# Patient Record
Sex: MALE | Race: BLACK OR AFRICAN AMERICAN | NOT HISPANIC OR LATINO | Employment: OTHER | URBAN - METROPOLITAN AREA
[De-identification: names, ages, dates, MRNs, and addresses within clinical notes are randomized per-mention and may not be internally consistent; named-entity substitution may affect disease eponyms.]

---

## 2017-02-20 DIAGNOSIS — M1A.00X0 IDIOPATHIC CHRONIC GOUT WITHOUT TOPHUS, UNSPECIFIED SITE: ICD-10-CM

## 2017-02-20 DIAGNOSIS — N52.8 OTHER MALE ERECTILE DYSFUNCTION: ICD-10-CM

## 2017-02-20 NOTE — TELEPHONE ENCOUNTER
Allopurinol 100 mg        Last Written Prescription Date: 9/23/16  Last Fill Quantity: 90, # refills: 1  Last Office Visit with [facility], [facility] or  Health prescribing provider:  10/7/16        Uric Acid   Date Value Ref Range Status   09/16/2016 9.3 (H) 3.5 - 8.5 mg/dL Final   ]  Creatinine   Date Value Ref Range Status   03/09/2016 2.10 (H) 0.66 - 1.25 mg/dL Final   ]  Lab Results   Component Value Date    WBC 5.0 03/10/2014     Lab Results   Component Value Date    RBC 3.92 03/10/2014     Lab Results   Component Value Date    HGB 8.6 03/22/2014     Lab Results   Component Value Date    HCT 33.1 03/10/2014     No components found for: MCT  Lab Results   Component Value Date    MCV 84 03/10/2014     Lab Results   Component Value Date    MCH 27.3 03/10/2014     Lab Results   Component Value Date    MCHC 32.3 03/10/2014     Lab Results   Component Value Date    RDW 12.9 03/10/2014     Lab Results   Component Value Date     03/21/2014     Lab Results   Component Value Date    AST 13 11/09/2013     Lab Results   Component Value Date    ALT 22 11/09/2013         viagra 100 mg       Last Written Prescription Date: 7/25/16  Last Fill Quantity: 6,  # refills: 10   Last Office Visit with [facility], [facility] or  Health prescribing provider: 10/7/16

## 2017-02-21 RX ORDER — SILDENAFIL 100 MG/1
100 TABLET, FILM COATED ORAL
Qty: 6 TABLET | Refills: 9 | Status: SHIPPED | OUTPATIENT
Start: 2017-02-21 | End: 2018-03-27

## 2017-02-21 RX ORDER — ALLOPURINOL 100 MG/1
100 TABLET ORAL DAILY
Qty: 90 TABLET | Refills: 1 | Status: SHIPPED | OUTPATIENT
Start: 2017-02-21 | End: 2017-10-09

## 2017-02-21 NOTE — TELEPHONE ENCOUNTER
Prescription approved per [facility] Refill Protocol.  Angelia Gore RN- Triage [employer]

## 2017-04-10 ENCOUNTER — OFFICE VISIT (OUTPATIENT)
Dept: FAMILY MEDICINE | Facility: CLINIC | Age: 57
End: 2017-04-10
Payer: COMMERCIAL

## 2017-04-10 VITALS
TEMPERATURE: 98 F | DIASTOLIC BLOOD PRESSURE: 80 MMHG | HEART RATE: 71 BPM | WEIGHT: 263 LBS | SYSTOLIC BLOOD PRESSURE: 128 MMHG | RESPIRATION RATE: 16 BRPM | BODY MASS INDEX: 32.7 KG/M2 | HEIGHT: 75 IN

## 2017-04-10 DIAGNOSIS — K21.9 GASTROESOPHAGEAL REFLUX DISEASE WITHOUT ESOPHAGITIS: ICD-10-CM

## 2017-04-10 DIAGNOSIS — R14.2 FLATULENCE, ERUCTATION AND GAS PAIN: ICD-10-CM

## 2017-04-10 DIAGNOSIS — I10 ESSENTIAL HYPERTENSION, BENIGN: Primary | Chronic | ICD-10-CM

## 2017-04-10 DIAGNOSIS — R14.3 FLATULENCE, ERUCTATION AND GAS PAIN: ICD-10-CM

## 2017-04-10 DIAGNOSIS — R14.1 FLATULENCE, ERUCTATION AND GAS PAIN: ICD-10-CM

## 2017-04-10 DIAGNOSIS — M1A.00X0 IDIOPATHIC CHRONIC GOUT WITHOUT TOPHUS, UNSPECIFIED SITE: ICD-10-CM

## 2017-04-10 PROCEDURE — 83516 IMMUNOASSAY NONANTIBODY: CPT | Performed by: FAMILY MEDICINE

## 2017-04-10 PROCEDURE — 99213 OFFICE O/P EST LOW 20 MIN: CPT | Performed by: FAMILY MEDICINE

## 2017-04-10 PROCEDURE — 86003 ALLG SPEC IGE CRUDE XTRC EA: CPT | Performed by: FAMILY MEDICINE

## 2017-04-10 PROCEDURE — 36415 COLL VENOUS BLD VENIPUNCTURE: CPT | Performed by: FAMILY MEDICINE

## 2017-04-10 PROCEDURE — 80053 COMPREHEN METABOLIC PANEL: CPT | Performed by: FAMILY MEDICINE

## 2017-04-10 PROCEDURE — 84550 ASSAY OF BLOOD/URIC ACID: CPT | Performed by: FAMILY MEDICINE

## 2017-04-10 NOTE — NURSING NOTE
"Chief Complaint   Patient presents with     Gastrointestinal Problem       Initial /80  Pulse 71  Temp 98  F (36.7  C) (Tympanic)  Resp 16  Ht 6' 3\" (1.905 m)  Wt 263 lb (119.3 kg)  BMI 32.87 kg/m2 Estimated body mass index is 32.87 kg/(m^2) as calculated from the following:    Height as of this encounter: 6' 3\" (1.905 m).    Weight as of this encounter: 263 lb (119.3 kg).  Medication Reconciliation: complete     Rosanne Navarro CMA      "

## 2017-04-10 NOTE — LETTER
[facility]  [address]  [facility] [id number] [phone]                                                                                                           Fabian Rod  [address]  [place] [facility] [address]    April 17, 2017      Dear Fabian,    The results of your recent tests were reviewed and are enclosed.     Mild allergy to shrimp, otherwise normal tests  Normal test, no evidence for gluten sensitivity  Metabolic panel shows decreased kidney function but improved from last test  Uric acid elevated, but improved from last test 6 mo ago  Results for orders placed or performed in visit on 04/10/17   Comprehensive metabolic panel (BMP + Alb, Alk Phos, ALT, AST, Total. Bili, TP)   Result Value Ref Range    Sodium 141 133 - 144 mmol/L    Potassium 5.0 3.4 - 5.3 mmol/L    Chloride 111 (H) 94 - 109 mmol/L    Carbon Dioxide 20 20 - 32 mmol/L    Anion Gap 10 3 - 14 mmol/L    Glucose 93 70 - 99 mg/dL    Urea Nitrogen 40 (H) 7 - 30 mg/dL    Creatinine 1.79 (H) 0.66 - 1.25 mg/dL    GFR Estimate 39 (L) >60 mL/min/1.7m2    GFR Estimate If Black 47 (L) >60 mL/min/1.7m2    Calcium 8.8 8.5 - 10.1 mg/dL    Bilirubin Total 0.2 0.2 - 1.3 mg/dL    Albumin 3.9 3.4 - 5.0 g/dL    Protein Total 7.7 6.8 - 8.8 g/dL    Alkaline Phosphatase 64 40 - 150 U/L    ALT 37 0 - 70 U/L    AST 20 0 - 45 U/L   Uric acid   Result Value Ref Range    Uric Acid 8.0 (H) 3.5 - 7.2 mg/dL   Allergy adult food panel   Result Value Ref Range    Allergen Clam  <0.10 KU(A)/L     <0.10  Interp: Class 0 - Negative, Consider nonallergic causes      Allergen Fish(Cod)  <0.10 KU(A)/L     <0.10  Interp: Class 0 - Negative, Consider nonallergic causes      Allergen Egg White  <0.10 KU(A)/L     <0.10  Interp: Class 0 - Negative, Consider nonallergic causes      Allergen [place]  <0.10 KU(A)/L     <0.10  Interp: Class 0 - Negative, Consider nonallergic causes      Allergen Milk  <0.10 KU/L      <0.10  Interp: Class 0 - Negative, Consider nonallergic causes      Allergen Peanut  <0.10 KU(A)/L     <0.10  Interp: Class 0 - Negative, Consider nonallergic causes      Allergen Shrimp 0.18 (H) <0.10 KU(A)/L    Allergen Soybean IgE  <0.10 KU(A)/L     <0.10  Interp: Class 0 - Negative, Consider nonallergic causes      Allergen, [place]  <0.10 KU(A)/L     <0.10  Interp: Class 0 - Negative, Consider nonallergic causes      Allergen Wheat  <0.10 KU(A)/L     <0.10  Interp: Class 0 - Negative, Consider nonallergic causes      Allergen Scallop  <0.10 KU(A)/L     <0.10  Interp: Class 0 - Negative, Consider nonallergic causes     Tissue transglutaminase spenser IgA and IgG   Result Value Ref Range    Tissue Transglutaminase Antibody IgA 1 <7 U/mL    Tissue Transglutaminase Spenser IgG <1  Negative   <7 U/mL             Thank you for choosing [facility].  We appreciate the opportunity to serve you and look forward to supporting your healthcare needs in the future.    If you have any questions or concerns, please call me or my staff at [phone].      Sincerely,    Eze Srtong MD

## 2017-04-10 NOTE — MR AVS SNAPSHOT
After Visit Summary   4/10/2017    Fabian Rod    MRN: [id number]           Patient Information     Date Of Birth          1960        Visit Information        Provider Department      4/10/2017 4:30 PM Eze Strong MD [facility]        Today's Diagnoses     Essential hypertension, benign    -  1    Idiopathic chronic gout without tophus, unspecified site        Gastroesophageal reflux disease without esophagitis        Flatulence, eructation and gas pain          Care Instructions    Avoid dairy products for 2 weeks        Follow-ups after your visit        Your next 10 appointments already scheduled     Apr 10, 2017  4:30 PM CDT   (Arrive by 2:40 PM)   [facility] with Eze Strong MD   [facility]    [address] [place] [id number] [phone]              Future tests that were ordered for you today     Open Future Orders        Priority Expected Expires Ordered    H Pylori antigen, stool Routine  5/10/2017 4/10/2017            Who to contact     If you have questions or need follow up information about today's clinic visit or your schedule please contact [facility] directly at [phone].  Normal or non-critical lab and imaging results will be communicated to you by MyChart, letter or phone within 4 business days after the clinic has received the results. If you do not hear from us within 7 days, please contact the clinic through MyChart or phone. If you have a critical or abnormal lab result, we will notify you by phone as soon as possible.  Submit refill requests through [employer] or call your pharmacy and they will forward the refill request to us. Please allow 3 business days for your refill to be completed.          Additional Information About Your Visit        [employer] Information     [employer] lets you send messages to your  "doctor, view your test results, renew your prescriptions, schedule appointments and more. To sign up, go to [email or web address] . Click on \"Log in\" on the left side of the screen, which will take you to the Welcome page. Then click on \"Sign up Now\" on the right side of the page.     You will be asked to enter the access code listed below, as well as some personal information. Please follow the directions to create your username and password.     Your access code is: [email or web address]  Expires: 2017  2:36 PM     Your access code will  in 90 days. If you need help or a new code, please call your [place] clinic or [phone].        Care EveryWhere ID     This is your Care EveryWhere ID. This could be used by other organizations to access your [place] medical records  [id number]        Your Vitals Were     Pulse Temperature Respirations Height BMI (Body Mass Index)       71 98  F (36.7  C) (Tympanic) 16 6' 3\" (1.905 m) 32.87 kg/m2        Blood Pressure from Last 3 Encounters:   04/10/17 128/80   10/18/16 (!) 153/91   10/07/16 146/88    Weight from Last 3 Encounters:   04/10/17 263 lb (119.3 kg)   10/18/16 253 lb 9.6 oz (115 kg)   10/07/16 258 lb (117 kg)              We Performed the Following     Allergy adult food panel     Comprehensive metabolic panel (BMP + Alb, Alk Phos, ALT, AST, Total. Bili, TP)     Tissue transglutaminase spenser IgA and IgG     Uric acid        Primary Care Provider Office Phone # Fax #    Eze Strong [phone]       [facility] [address] [facility] [address]        Thank you!     Thank you for choosing [facility]  for your care. Our goal is always to provide you with excellent care. Hearing back from our patients is one way we can continue to improve our services. Please take a few minutes to complete the written survey that you may receive in the mail after your visit with us. Thank you!             Your " Updated Medication List - Protect others around you: Learn how to safely use, store and throw away your medicines at www.disposemymeds.org.          This list is accurate as of: 4/10/17  2:36 PM.  Always use your most recent med list.                   Brand Name Dispense Instructions for use    allopurinol 100 MG tablet    ZYLOPRIM    90 tablet    Take 1 tablet (100 mg) by mouth daily       fluticasone 50 MCG/ACT spray    FLONASE    1 Package    Spray 2 sprays into both nostrils daily       indomethacin 50 MG capsule    INDOCIN    42 capsule    TAKE ONE CAPSULE BY MOUTH THREE TIMES DAILY WITH MEALS.       lisinopril-hydrochlorothiazide 20-12.5 MG per tablet    PRINZIDE/ZESTORETIC    180 tablet    Take 2 tablets by mouth daily       MULTIVITAMIN PO      Take by mouth daily Reported on 4/10/2017       sildenafil 100 MG cap/tab    VIAGRA    6 tablet    Take 1 tablet (100 mg) by mouth every 3 days

## 2017-04-10 NOTE — PROGRESS NOTES
"  SUBJECTIVE:                                                    Fabian Rod is a 56 year old male who presents to clinic today for the following health issues:      Gastrointestinal symptoms      Duration: years    Description:           REFLUX SYMPTOMS - acid taste in mouth and belching, flatulence     Intensity:  moderate    Accompanying signs and symptoms:  diarrhea and bloating    History  Previous {similar problem: no   Previous evaluation:  none    Aggravating factors: dairy    Alleviating factors: proton pump inhibitor - prilosec and nexium and align    Other Therapies tried: None   Only GI medication he is taking is the \"Align\"   He has stopped the Omeprazole     He notes more gas thru the day, more flatulence     Hypertension Follow-up      Outpatient blood pressures are not being checked.    Low Salt Diet: no added salt     Gout has been in good control.  He continues with the Allopurinol    Problem list and histories reviewed & adjusted, as indicated.  Additional history: as documented    Labs reviewed in EPIC    Reviewed and updated as needed this visit by clinical staff  Tobacco  Allergies  Meds       Reviewed and updated as needed this visit by Provider         ROS:  CONSTITUTIONAL:NEGATIVE for fever, chills, change in weight  RESP:NEGATIVE for significant cough or SOB  CV: NEGATIVE for chest pain, palpitations or peripheral edema  GI: POSITIVE for gas or bloating and heartburn or reflux  MUSCULOSKELETAL: POSITIVE  for Hx gout    OBJECTIVE:                                                    /80  Pulse 71  Temp 98  F (36.7  C) (Tympanic)  Resp 16  Ht 6' 3\" (1.905 m)  Wt 263 lb (119.3 kg)  BMI 32.87 kg/m2  Body mass index is 32.87 kg/(m^2).  GENERAL APPEARANCE: healthy, alert and no distress  RESP: lungs clear to auscultation - no rales, rhonchi or wheezes  CV: regular rates and rhythm, normal S1 S2, no S3 or S4 and no murmur, click or rub  ABDOMEN: soft, nontender, without " hepatosplenomegaly or masses and bowel sounds normal  MS: extremities normal- no gross deformities noted    Diagnostic test results:  Lab: see below, results pending     ASSESSMENT/PLAN:                                                        ICD-10-CM    1. Essential hypertension, benign I10 Comprehensive metabolic panel (BMP + Alb, Alk Phos, ALT, AST, Total. Bili, TP)   2. Idiopathic chronic gout without tophus, unspecified site M1A.00X0 Uric acid   3. Gastroesophageal reflux disease without esophagitis K21.9 Comprehensive metabolic panel (BMP + Alb, Alk Phos, ALT, AST, Total. Bili, TP)   4. Flatulence, eructation and gas pain R14.3 Allergy adult food panel    R14.1 H Pylori antigen, stool    R14.2 Tissue transglutaminase spenser IgA and IgG       Follow up with Provider - as needed   Will notify Pt of lab results    Patient Instructions   Avoid dairy products for 2 weeks      Eze Strong MD  [facility]

## 2017-04-11 LAB
ALBUMIN SERPL-MCNC: 3.9 G/DL (ref 3.4–5)
ALP SERPL-CCNC: 64 U/L (ref 40–150)
ALT SERPL W P-5'-P-CCNC: 37 U/L (ref 0–70)
ANION GAP SERPL CALCULATED.3IONS-SCNC: 10 MMOL/L (ref 3–14)
AST SERPL W P-5'-P-CCNC: 20 U/L (ref 0–45)
BILIRUB SERPL-MCNC: 0.2 MG/DL (ref 0.2–1.3)
BUN SERPL-MCNC: 40 MG/DL (ref 7–30)
CALCIUM SERPL-MCNC: 8.8 MG/DL (ref 8.5–10.1)
CHLORIDE SERPL-SCNC: 111 MMOL/L (ref 94–109)
CO2 SERPL-SCNC: 20 MMOL/L (ref 20–32)
CREAT SERPL-MCNC: 1.79 MG/DL (ref 0.66–1.25)
GFR SERPL CREATININE-BSD FRML MDRD: 39 ML/MIN/1.7M2
GLUCOSE SERPL-MCNC: 93 MG/DL (ref 70–99)
POTASSIUM SERPL-SCNC: 5 MMOL/L (ref 3.4–5.3)
PROT SERPL-MCNC: 7.7 G/DL (ref 6.8–8.8)
SODIUM SERPL-SCNC: 141 MMOL/L (ref 133–144)
URATE SERPL-MCNC: 8 MG/DL (ref 3.5–7.2)

## 2017-04-12 LAB
TTG IGA SER-ACNC: 1 U/ML
TTG IGG SER-ACNC: NORMAL U/ML

## 2017-04-15 LAB
CLAM IGE QN: ABNORMAL KU(A)/L
CODFISH IGE QN: ABNORMAL KU(A)/L
CORN IGE QN: ABNORMAL KU(A)/L
COW MILK IGE QN: ABNORMAL KU/L
EGG WHITE IGE QN: ABNORMAL KU(A)/L
PEANUT IGE QN: ABNORMAL KU(A)/L
SCALLOP IGE QN: ABNORMAL KU(A)/L
SHRIMP IGE QN: 0.18 KU(A)/L
SOYBEAN IGE QN: ABNORMAL KU(A)/L
WALNUT IGE QN: ABNORMAL KU(A)/L
WHEAT IGE QN: ABNORMAL KU(A)/L

## 2017-05-19 ENCOUNTER — OFFICE VISIT (OUTPATIENT)
Dept: FAMILY MEDICINE | Facility: CLINIC | Age: 57
End: 2017-05-19
Payer: COMMERCIAL

## 2017-05-19 VITALS
TEMPERATURE: 97.4 F | RESPIRATION RATE: 20 BRPM | OXYGEN SATURATION: 100 % | WEIGHT: 263 LBS | SYSTOLIC BLOOD PRESSURE: 150 MMHG | HEART RATE: 70 BPM | BODY MASS INDEX: 32.87 KG/M2 | DIASTOLIC BLOOD PRESSURE: 88 MMHG

## 2017-05-19 DIAGNOSIS — M25.532 LEFT WRIST PAIN: Primary | ICD-10-CM

## 2017-05-19 DIAGNOSIS — M1A.00X0 IDIOPATHIC CHRONIC GOUT WITHOUT TOPHUS, UNSPECIFIED SITE: ICD-10-CM

## 2017-05-19 DIAGNOSIS — M67.40 GANGLION CYST: ICD-10-CM

## 2017-05-19 PROCEDURE — 99213 OFFICE O/P EST LOW 20 MIN: CPT | Performed by: FAMILY MEDICINE

## 2017-05-19 RX ORDER — PREDNISONE 10 MG/1
TABLET ORAL
Qty: 12 TABLET | Refills: 0 | Status: SHIPPED | OUTPATIENT
Start: 2017-05-19 | End: 2017-10-09

## 2017-05-19 NOTE — MR AVS SNAPSHOT
"              After Visit Summary   2017    Fabian Rod    MRN: [id number]           Patient Information     Date Of Birth          1960        Visit Information        Provider Department      2017 1:15 PM Eze Strong MD [facility]        Today's Diagnoses     Left wrist pain    -  1    Idiopathic chronic gout without tophus, unspecified site           Follow-ups after your visit        Who to contact     If you have questions or need follow up information about today's clinic visit or your schedule please contact [facility] directly at [phone].  Normal or non-critical lab and imaging results will be communicated to you by [employer], letter or phone within 4 business days after the clinic has received the results. If you do not hear from us within 7 days, please contact the clinic through [employer] or phone. If you have a critical or abnormal lab result, we will notify you by phone as soon as possible.  Submit refill requests through [employer] or call your pharmacy and they will forward the refill request to us. Please allow 3 business days for your refill to be completed.          Additional Information About Your Visit        MyChart Information     [employer] lets you send messages to your doctor, view your test results, renew your prescriptions, schedule appointments and more. To sign up, go to [email or web address] . Click on \"Log in\" on the left side of the screen, which will take you to the Welcome page. Then click on \"Sign up Now\" on the right side of the page.     You will be asked to enter the access code listed below, as well as some personal information. Please follow the directions to create your username and password.     Your access code is: [email or web address]  Expires: 2017  2:36 PM     Your access code will  in 90 days. If you need help or a new code, please call your [facility] or [phone].        Care " EveryWhere ID     This is your Care EveryWhere ID. This could be used by other organizations to access your [place] medical records  [id number]        Your Vitals Were     Pulse Temperature Respirations Pulse Oximetry BMI (Body Mass Index)       70 97.4  F (36.3  C) (Tympanic) 20 100% 32.87 kg/m2        Blood Pressure from Last 3 Encounters:   05/19/17 150/88   04/10/17 128/80   10/18/16 (!) 153/91    Weight from Last 3 Encounters:   05/19/17 263 lb (119.3 kg)   04/10/17 263 lb (119.3 kg)   10/18/16 253 lb 9.6 oz (115 kg)              Today, you had the following     No orders found for display         Today's Medication Changes          These changes are accurate as of: 5/19/17  1:29 PM.  If you have any questions, ask your nurse or doctor.               Start taking these medicines.        Dose/Directions    predniSONE 10 MG tablet   Commonly known as:  DELTASONE   Used for:  Left wrist pain, Idiopathic chronic gout without tophus, unspecified site   Started by:  Eze Strong MD        Take 2 daily for 2 days, then 1 daily for 5 days, then 1/2 daily   Quantity:  12 tablet   Refills:  0            Where to get your medicines      These medications were sent to [id number] - [place] - [address]  [address] [address]     Phone:  [phone]     predniSONE 10 MG tablet                Primary Care Provider Office Phone # Fax #    Eze Strong [phone]       [facility] [address]  [facility] [address]        Thank you!     Thank you for choosing [facility]  for your care. Our goal is always to provide you with excellent care. Hearing back from our patients is one way we can continue to improve our services. Please take a few minutes to complete the written survey that you may receive in the mail after your visit with us. Thank you!             Your Updated Medication List - Protect others around you: Learn how to  safely use, store and throw away your medicines at www.disposemymeds.org.          This list is accurate as of: 5/19/17  1:29 PM.  Always use your most recent med list.                   Brand Name Dispense Instructions for use    allopurinol 100 MG tablet    ZYLOPRIM    90 tablet    Take 1 tablet (100 mg) by mouth daily       fluticasone 50 MCG/ACT spray    FLONASE    1 Package    Spray 2 sprays into both nostrils daily       indomethacin 50 MG capsule    INDOCIN    42 capsule    TAKE ONE CAPSULE BY MOUTH THREE TIMES DAILY WITH MEALS.       lisinopril-hydrochlorothiazide 20-12.5 MG per tablet    PRINZIDE/ZESTORETIC    180 tablet    TAKE 2 TABLETS DAILY       MULTIVITAMIN PO      Take by mouth daily Reported on 4/10/2017       predniSONE 10 MG tablet    DELTASONE    12 tablet    Take 2 daily for 2 days, then 1 daily for 5 days, then 1/2 daily       sildenafil 100 MG cap/tab    VIAGRA    6 tablet    Take 1 tablet (100 mg) by mouth every 3 days

## 2017-05-19 NOTE — NURSING NOTE
"Chief Complaint   Patient presents with     Wrist Pain       Initial /88 (BP Location: Left arm, Patient Position: Chair, Cuff Size: Adult Large)  Pulse 70  Temp 97.4  F (36.3  C) (Tympanic)  Resp 20  Wt 263 lb (119.3 kg)  SpO2 100%  BMI 32.87 kg/m2 Estimated body mass index is 32.87 kg/(m^2) as calculated from the following:    Height as of 4/10/17: 6' 3\" (1.905 m).    Weight as of this encounter: 263 lb (119.3 kg).  Medication Reconciliation: complete     Princess MONICA Otoole CMA      "

## 2017-05-19 NOTE — LETTER
[facility]  [address]  [place] [address]  [phone]                                                                                                           Fabian Rod  [address] [address]    May 19, 2017      To Whom it Concerns:     Fabian Marcel, was seen today.  He may be at work but light duty no lifting or twisting for Lt wrist for the next week.  After that he may return to full duty                  Sincerely,    Eze Strong MD

## 2017-05-19 NOTE — PATIENT INSTRUCTIONS
Ice packs to wrist for 4-5 days, then alternate ice & heat.  Use Ice massage on trigger point areas.  Do gentle Range of motion exercises

## 2017-05-19 NOTE — PROGRESS NOTES
SUBJECTIVE:                                                    Fabian Rod is a 56 year old male who presents to clinic today for the following health issues:    Musculoskeletal problem/pain      Duration: x 6 days    Description  Location: Left wrist    Intensity:  mild, severe    Accompanying signs and symptoms: swelling    History  Previous similar problem: no   Previous evaluation:  none    Precipitating or alleviating factors:  Trauma or overuse: YES does work long shifts at [employer]. 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  Additional history: as documented    Labs reviewed in EPIC    Reviewed and updated as needed this visit by clinical staff  Tobacco  Allergies  Meds  Problems  Med Hx  Surg Hx  Fam Hx  Soc Hx        Reviewed and updated as needed this visit by Provider  Allergies  Meds  Problems         ROS:  CONSTITUTIONAL:NEGATIVE for fever, chills, change in weight  MUSCULOSKELETAL: POSITIVE  for arthralgias Lt wrist and joint pain Lt wrist.  Hx of gout    OBJECTIVE:                                                    /88 (BP Location: Left arm, Patient Position: Chair, Cuff Size: Adult Large)  Pulse 70  Temp 97.4  F (36.3  C) (Tympanic)  Resp 20  Wt 263 lb (119.3 kg)  SpO2 100%  BMI 32.87 kg/m2  Body mass index is 32.87 kg/(m^2).  GENERAL APPEARANCE: healthy, alert and no distress  MS: extremities normal- no gross deformities noted  ORTHO: Wrist Exam: WRIST:  Inspection: swelling Lt lateral dorsum wrist, ganglion cyst present  Palpation: Tender: extensor tendons, flexor tendons  Non-tender: distal radius, distal ulna  Range of Motion: normal, painful  Strength: no deficits  Special tests: negative Tinel's at carpal tunnel.      ELBOW:  elbow exam not done      Diagnostic test  results:  none      ASSESSMENT/PLAN:                                                        ICD-10-CM    1. Left wrist pain M25.532 predniSONE (DELTASONE) 10 MG tablet   2. Idiopathic chronic gout without tophus, unspecified site M1A.00X0 predniSONE (DELTASONE) 10 MG tablet   3. Ganglion cyst M67.40     Lt wrist       Follow up with Provider - as needed if not improving   Patient Instructions   Ice packs to wrist for 4-5 days, then alternate ice & heat.  Use Ice massage on trigger point areas.  Do gentle Range of motion exercises      Eze Strong MD  [facility]

## 2017-05-26 ENCOUNTER — TELEPHONE (OUTPATIENT)
Dept: FAMILY MEDICINE | Facility: CLINIC | Age: 57
End: 2017-05-26

## 2017-05-26 DIAGNOSIS — M25.532 LEFT WRIST PAIN: Primary | ICD-10-CM

## 2017-05-26 NOTE — TELEPHONE ENCOUNTER
Pt is requesting a new note from provider stating he can return to work with no restrictions.   He can  note at [facility] today.

## 2017-05-26 NOTE — TELEPHONE ENCOUNTER
Called and informed patient letter is ready for pickup at [facility].    Princess MONICA Otoole, [facility]

## 2017-05-26 NOTE — LETTER
[facility]  [address]  [place] [address]  [phone]                                                                                                           Fabian Rod  [address]  [place] [address]    May 26, 2017      To Whom it Concerns     Fabian Rod, may return to work without any restrictions as of Saturday May 27,2017                    Sincerely,    Eze Strong MD

## 2017-09-05 ENCOUNTER — OFFICE VISIT (OUTPATIENT)
Dept: FAMILY MEDICINE | Facility: CLINIC | Age: 57
End: 2017-09-05
Payer: COMMERCIAL

## 2017-09-05 VITALS
OXYGEN SATURATION: 99 % | RESPIRATION RATE: 16 BRPM | TEMPERATURE: 98.4 F | HEIGHT: 75 IN | DIASTOLIC BLOOD PRESSURE: 82 MMHG | BODY MASS INDEX: 32.33 KG/M2 | SYSTOLIC BLOOD PRESSURE: 138 MMHG | HEART RATE: 72 BPM | WEIGHT: 260 LBS

## 2017-09-05 DIAGNOSIS — H66.002 ACUTE SUPPURATIVE OTITIS MEDIA OF LEFT EAR WITHOUT SPONTANEOUS RUPTURE OF TYMPANIC MEMBRANE, RECURRENCE NOT SPECIFIED: Primary | ICD-10-CM

## 2017-09-05 DIAGNOSIS — H60.392 OTHER INFECTIVE ACUTE OTITIS EXTERNA OF LEFT EAR: ICD-10-CM

## 2017-09-05 DIAGNOSIS — R14.2 FLATULENCE, ERUCTATION AND GAS PAIN: ICD-10-CM

## 2017-09-05 DIAGNOSIS — R14.1 FLATULENCE, ERUCTATION AND GAS PAIN: ICD-10-CM

## 2017-09-05 DIAGNOSIS — R14.3 FLATULENCE, ERUCTATION AND GAS PAIN: ICD-10-CM

## 2017-09-05 PROCEDURE — 99214 OFFICE O/P EST MOD 30 MIN: CPT | Performed by: PHYSICIAN ASSISTANT

## 2017-09-05 RX ORDER — AMOXICILLIN 875 MG
875 TABLET ORAL 2 TIMES DAILY
Qty: 20 TABLET | Refills: 0 | Status: SHIPPED | OUTPATIENT
Start: 2017-09-05 | End: 2017-09-18

## 2017-09-05 RX ORDER — CIPROFLOXACIN 0.5 MG/.25ML
0.25 SOLUTION/ DROPS AURICULAR (OTIC) EVERY 12 HOURS
Qty: 3.5 ML | Refills: 0 | Status: SHIPPED | OUTPATIENT
Start: 2017-09-05 | End: 2017-09-12

## 2017-09-05 NOTE — PATIENT INSTRUCTIONS
While waiting for gastroenterology try using ranitidine (Zantac) twice daily for your symptoms. Also try to avoid foods as noted below.       These home care steps can help you manage GERD:    Maintain a healthy weight. Get help to lose any extra pounds.    Avoid lying down after meals.    Avoid eating late at night.    Elevate the head of your bed by 6 inches. You can do this by placing wooden blocks or bed risers under the head of your bed.    Avoid wearing tight-fitting clothes.    Avoid foods that might irritate your stomach, such as the following:    Alcohol    Fat    Chocolate    Caffeine    Spearmint or peppermint    Talk to your healthcare provider if you are taking any of the following medicines. These medicines can make GERD symptoms worse:    Calcium channel blockers    Theophylline    Anticholinergic medicines, such as oxybutynin and benzatropine    Begin an exercise program. Ask your healthcare provider how to get started. You can benefit from simple activities, such as walking or gardening.    Break the smoking habit. Enroll in a stop-smoking program to improve your chances of success.    Limit alcohol intake to no more than 2 drinks a day.    Take your medicines exactly as directed. Don t skip doses.    Avoid over-the-counter nonsteroidal anti-inflammatory medicines, such as aspirin and ibuprofen, unless recommended by your healthcare provider for certain conditions.     If possible, avoid nitrates (heart medicines, such as nitroglycerin and isosorbide dinitrate ).  Follow-up care  Make a follow-up appointment as directed by our staff.     When to call the healthcare provider  Call your healthcare provider immediately if you have any of the following:    Trouble swallowing    Pain when swallowing    Feeling of food caught in your chest or throat    Pain in the neck, chest, or back    Heartburn that causes you to vomit    Vomiting blood    Black or tarry stools (from digested blood)    More saliva  (watering of the mouth) than usual    Weight loss of more than 3% to 5% of your total body weight in a month    Hoarseness or sore throat that won t go away    Choking, coughing, or wheezing   Date Last Reviewed: 7/1/2016 2000-2017 [employer]. [address] [place] [address]. All rights reserved. This information is not intended as a substitute for professional medical care. Always follow your healthcare professional's instructions.

## 2017-09-05 NOTE — MR AVS SNAPSHOT
After Visit Summary   9/5/2017    Fabian Rod    MRN: [id number]           Patient Information     Date Of Birth          1960        Visit Information        Provider Department      9/5/2017 10:30 AM Siegler, Nicole Joy, PA-C [facility]        Today's Diagnoses     Acute suppurative otitis media of left ear without spontaneous rupture of tympanic membrane, recurrence not specified    -  1    Other infective acute otitis externa of left ear        Flatulence, eructation and gas pain          Care Instructions    While waiting for gastroenterology try using ranitidine (Zantac) twice daily for your symptoms. Also try to avoid foods as noted below.       These home care steps can help you manage GERD:    Maintain a healthy weight. Get help to lose any extra pounds.    Avoid lying down after meals.    Avoid eating late at night.    Elevate the head of your bed by 6 inches. You can do this by placing wooden blocks or bed risers under the head of your bed.    Avoid wearing tight-fitting clothes.    Avoid foods that might irritate your stomach, such as the following:    Alcohol    Fat    Chocolate    Caffeine    Spearmint or peppermint    Talk to your healthcare provider if you are taking any of the following medicines. These medicines can make GERD symptoms worse:    Calcium channel blockers    Theophylline    Anticholinergic medicines, such as oxybutynin and benzatropine    Begin an exercise program. Ask your healthcare provider how to get started. You can benefit from simple activities, such as walking or gardening.    Break the smoking habit. Enroll in a stop-smoking program to improve your chances of success.    Limit alcohol intake to no more than 2 drinks a day.    Take your medicines exactly as directed. Don t skip doses.    Avoid over-the-counter nonsteroidal anti-inflammatory medicines, such as aspirin and ibuprofen, unless recommended by your healthcare provider  for certain conditions.     If possible, avoid nitrates (heart medicines, such as nitroglycerin and isosorbide dinitrate ).  Follow-up care  Make a follow-up appointment as directed by our staff.     When to call the healthcare provider  Call your healthcare provider immediately if you have any of the following:    Trouble swallowing    Pain when swallowing    Feeling of food caught in your chest or throat    Pain in the neck, chest, or back    Heartburn that causes you to vomit    Vomiting blood    Black or tarry stools (from digested blood)    More saliva (watering of the mouth) than usual    Weight loss of more than 3% to 5% of your total body weight in a month    Hoarseness or sore throat that won t go away    Choking, coughing, or wheezing   Date Last Reviewed: 7/1/2016 2000-2017 [employer]. [address] [place] [address]. All rights reserved. This information is not intended as a substitute for professional medical care. Always follow your healthcare professional's instructions.                Follow-ups after your visit        Who to contact     If you have questions or need follow up information about today's clinic visit or your schedule please contact [facility] directly at [phone].  Normal or non-critical lab and imaging results will be communicated to you by [employer], letter or phone within 4 business days after the clinic has received the results. If you do not hear from us within 7 days, please contact the clinic through [employer] or phone. If you have a critical or abnormal lab result, we will notify you by phone as soon as possible.  Submit refill requests through [employer] or call your pharmacy and they will forward the refill request to us. Please allow 3 business days for your refill to be completed.          Additional Information About Your Visit        [employer] Information     [employer] lets you send messages to your doctor, view your test  "results, renew your prescriptions, schedule appointments and more. To sign up, go to [email or web address] . Click on \"Log in\" on the left side of the screen, which will take you to the Welcome page. Then click on \"Sign up Now\" on the right side of the page.     You will be asked to enter the access code listed below, as well as some personal information. Please follow the directions to create your username and password.     Your access code is: [email or web address]  Expires: 2017 10:54 AM     Your access code will  in 90 days. If you need help or a new code, please call your [facility] [facility] clinic or [phone].        Care EveryWhere ID     This is your Care EveryWhere ID. This could be used by other organizations to access your [facility] [facility] medical records  [id number]        Your Vitals Were     Pulse Temperature Respirations Height Pulse Oximetry BMI (Body Mass Index)    72 98.4  F (36.9  C) (Tympanic) 16 6' 3\" (1.905 m) 99% 32.5 kg/m2       Blood Pressure from Last 3 Encounters:   17 138/82   17 150/88   04/10/17 128/80    Weight from Last 3 Encounters:   17 260 lb (117.9 kg)   17 263 lb (119.3 kg)   04/10/17 263 lb (119.3 kg)              Today, you had the following     No orders found for display         Today's Medication Changes          These changes are accurate as of: 17 10:54 AM.  If you have any questions, ask your nurse or doctor.               Start taking these medicines.        Dose/Directions    amoxicillin 875 MG tablet   Commonly known as:  AMOXIL   Used for:  Acute suppurative otitis media of left ear without spontaneous rupture of tympanic membrane, recurrence not specified   Started by:  Siegler, Nicole Joy, PA-C        Dose:  875 mg   Take 1 tablet (875 mg) by mouth 2 times daily   Quantity:  20 tablet   Refills:  0       ciprofloxacin 0.2 % otic solution   Commonly known as:  CETRAXAL   Used for:  Other infective acute otitis externa of left ear   Started by:  " Siegler, Nicole Joy, PA-C        Dose:  0.25 mL   Place 0.25 mLs Into the left ear every 12 hours for 7 days   Quantity:  3.5 mL   Refills:  0            Where to get your medicines      These medications were sent to [id number] - [place], [place] - [address]  [address] [place] [address]     Phone:  [phone]     amoxicillin 875 MG tablet    ciprofloxacin 0.2 % otic solution                Primary Care Provider Office Phone # Fax #    Eze Strong [phone] [address] [facility] [address]        Equal Access to Services     [facility]SHAN [facility] : Hadii sharif payton hadasho Soomaali, waaxda luqadaha, qaybta kaalmada adeegyada, breanna aceves. So [facility] [phone].    ATENCIÓN: Si habla español, tiene a sorenson disposición servicios gratuitos de asistencia lingüística. Llame al [phone].    We comply with applicable federal civil rights laws and [place] laws. We do not discriminate on the basis of race, color, national origin, age, disability sex, sexual orientation or gender identity.            Thank you!     Thank you for choosing [facility]  for your care. Our goal is always to provide you with excellent care. Hearing back from our patients is one way we can continue to improve our services. Please take a few minutes to complete the written survey that you may receive in the mail after your visit with us. Thank you!             Your Updated Medication List - Protect others around you: Learn how to safely use, store and throw away your medicines at www.disposemymeds.org.          This list is accurate as of: 9/5/17 10:54 AM.  Always use your most recent med list.                   Brand Name Dispense Instructions for use Diagnosis    allopurinol 100 MG tablet    ZYLOPRIM    90 tablet    Take 1 tablet (100 mg) by mouth daily    Idiopathic chronic gout without tophus, unspecified site       amoxicillin 875 MG tablet     AMOXIL    20 tablet    Take 1 tablet (875 mg) by mouth 2 times daily    Acute suppurative otitis media of left ear without spontaneous rupture of tympanic membrane, recurrence not specified       ciprofloxacin 0.2 % otic solution    CETRAXAL    3.5 mL    Place 0.25 mLs Into the left ear every 12 hours for 7 days    Other infective acute otitis externa of left ear       fluticasone 50 MCG/ACT spray    FLONASE    1 Package    Spray 2 sprays into both nostrils daily    Bad breath       indomethacin 50 MG capsule    INDOCIN    42 capsule    TAKE ONE CAPSULE BY MOUTH THREE TIMES DAILY WITH MEALS.    Right hand pain       lisinopril-hydrochlorothiazide 20-12.5 MG per tablet    PRINZIDE/ZESTORETIC    180 tablet    TAKE 2 TABLETS DAILY    Essential hypertension, benign       MULTIVITAMIN PO      Take by mouth daily Reported on 4/10/2017        predniSONE 10 MG tablet    DELTASONE    12 tablet    Take 2 daily for 2 days, then 1 daily for 5 days, then 1/2 daily    Left wrist pain, Idiopathic chronic gout without tophus, unspecified site       sildenafil 100 MG cap/tab    VIAGRA    6 tablet    Take 1 tablet (100 mg) by mouth every 3 days    Other male erectile dysfunction

## 2017-09-05 NOTE — PROGRESS NOTES
SUBJECTIVE:   Fabian Rod is a 57 year old male who presents to clinic today for the following health issues:    Left Ear Problem      Duration: X2 weeks    Description (location/character/radiation): Plugged with brownish drainage.    Intensity:  mild    Accompanying signs and symptoms: See above    History (similar episodes/previous evaluation): Yes    Precipitating or alleviating factors: None    Therapies tried and outcome: Has used OTC product for ear pain     Ear discharge when he wakes up and during the day he has clogged sensation. He can still hear out of the ear. Cold symptoms that he had are improved.     Gastrointestinal symptoms      Duration: Ongoing    Description:           Increased gas and burping    Intensity:  moderate, severe    Accompanying signs and symptoms:  none    History  Previous similar problem: YES  Previous evaluation:  Has appointment with [facility]    Aggravating factors: none and     Alleviating factors: nothing and stopped all milk products    Other Therapies tried: Phazyme     He does drink a lot of water and has daily belching and burping, as above.   He has taken prilosec and many other products in the past.   He does still have regular BMs without blood or mucus.     Problem list and histories reviewed & adjusted, as indicated.  Additional history: as documented    Patient Active Problem List   Diagnosis     Hyperlipidemia LDL goal <100     Knee pain     Head & neck symptoms NEC     Anemia     Halitosis     Essential hypertension, benign     Avascular necrosis of bone of hip (H)     OA (osteoarthritis) of hip     Hip pain     ACP (advance care planning)     Other male erectile dysfunction     Arthralgia of right hand     Idiopathic chronic gout without tophus, unspecified site     Non morbid obesity due to excess calories     Ganglion cyst     Past Surgical History:   Procedure Laterality Date     ARTHROPLASTY MINIMALLY INVASIVE HIP  3/18/2014    Procedure: ARTHROPLASTY  MINIMALLY INVASIVE HIP;  Left Hip Arthroplasty Minimally Invasive Two Incision;  Surgeon: Azar Ordoñez MD;  Location: [facility] OR     HC TOOTH EXTRACTION W/FORCEP  1996     TONSILLECTOMY  04/2006       Social History   Substance Use Topics     Smoking status: Former Smoker     Smokeless tobacco: Never Used      Comment: quit 20 years ago     Alcohol use 6.0 oz/week     12 Cans of beer per week      Comment: Per week     Family History   Problem Relation Age of Onset     Family History Negative Mother      Family History Negative Father      DIABETES No family hx of      Coronary Artery Disease No family hx of      Hypertension No family hx of      Hyperlipidemia No family hx of      CEREBROVASCULAR DISEASE No family hx of      Breast Cancer No family hx of      Colon Cancer No family hx of      Prostate Cancer No family hx of      Other Cancer No family hx of      Depression No family hx of      Anxiety Disorder No family hx of      MENTAL ILLNESS No family hx of      Substance Abuse No family hx of      Anesthesia Reaction No family hx of      Asthma No family hx of      OSTEOPOROSIS No family hx of      Genetic Disorder No family hx of      Thyroid Disease No family hx of      Obesity No family hx of      Unknown/Adopted No family hx of          Current Outpatient Prescriptions   Medication Sig Dispense Refill     amoxicillin (AMOXIL) 875 MG tablet Take 1 tablet (875 mg) by mouth 2 times daily 20 tablet 0     ciprofloxacin (CETRAXAL) 0.2 % otic solution Place 0.25 mLs Into the left ear every 12 hours for 7 days 3.5 mL 0     lisinopril-hydrochlorothiazide (PRINZIDE/ZESTORETIC) 20-12.5 MG per tablet TAKE 2 TABLETS DAILY 180 tablet 1     allopurinol (ZYLOPRIM) 100 MG tablet Take 1 tablet (100 mg) by mouth daily 90 tablet 1     sildenafil (VIAGRA) 100 MG cap/tab Take 1 tablet (100 mg) by mouth every 3 days 6 tablet 9     indomethacin (INDOCIN) 50 MG capsule TAKE ONE CAPSULE BY MOUTH THREE TIMES DAILY WITH MEALS. 42  "capsule 0     fluticasone (FLONASE) 50 MCG/ACT nasal spray Spray 2 sprays into both nostrils daily 1 Package 11     Multiple Vitamins-Minerals (MULTIVITAMIN OR) Take by mouth daily Reported on 4/10/2017       predniSONE (DELTASONE) 10 MG tablet Take 2 daily for 2 days, then 1 daily for 5 days, then 1/2 daily (Patient not taking: Reported on 9/5/2017) 12 tablet 0         Reviewed and updated as needed this visit by clinical staffTobacco  Allergies  Meds  Med Hx  Surg Hx  Fam Hx  Soc Hx      Reviewed and updated as needed this visit by Provider         ROS:  Constitutional, HEENT, cardiovascular, pulmonary, gi and gu systems are negative, except as otherwise noted.      OBJECTIVE:   /82 (BP Location: Right arm, Patient Position: Sitting, Cuff Size: Adult Regular)  Pulse 72  Temp 98.4  F (36.9  C) (Tympanic)  Resp 16  Ht 6' 3\" (1.905 m)  Wt 260 lb (117.9 kg)  SpO2 99%  BMI 32.5 kg/m2  Body mass index is 32.5 kg/(m^2).  GENERAL: healthy, alert and no distress  EYES: Eyes grossly normal to inspection, PERRL and conjunctivae and sclerae normal  HENT: normal cephalic/atraumatic, right ear: normal: no effusions, no erythema, normal landmarks, left ear: canal with creamy discharge and erythema, TM with purulent effusion and bulging, nose and mouth without ulcers or lesions, oropharynx clear and oral mucous membranes moist  NECK: no adenopathy, no asymmetry, masses, or scars and thyroid normal to palpation  ABDOMEN: soft, nontender, no hepatosplenomegaly, no masses and bowel sounds normal  SKIN: no suspicious lesions or rashes  PSYCH: mentation appears normal, affect normal/bright    Diagnostic Test Results:  none     ASSESSMENT/PLAN:       ICD-10-CM    1. Acute suppurative otitis media of left ear without spontaneous rupture of tympanic membrane, recurrence not specified H66.002 amoxicillin (AMOXIL) 875 MG tablet   2. Other infective acute otitis externa of left ear H60.392 ciprofloxacin (CETRAXAL) 0.2 % " otic solution   3. Flatulence, eructation and gas pain R14.3     R14.1     R14.2        Discussed medication use for otitis externa and media, be seen again if symptoms not completely improved with this medication. Discussed instructions below as well while he waits for gastroenterology consultation next month.     Patient Instructions     While waiting for gastroenterology try using ranitidine (Zantac) twice daily for your symptoms. Also try to avoid foods as noted below.       These home care steps can help you manage GERD:    Maintain a healthy weight. Get help to lose any extra pounds.    Avoid lying down after meals.    Avoid eating late at night.    Elevate the head of your bed by 6 inches. You can do this by placing wooden blocks or bed risers under the head of your bed.    Avoid wearing tight-fitting clothes.    Avoid foods that might irritate your stomach, such as the following:    Alcohol    Fat    Chocolate    Caffeine    Spearmint or peppermint    Talk to your healthcare provider if you are taking any of the following medicines. These medicines can make GERD symptoms worse:    Calcium channel blockers    Theophylline    Anticholinergic medicines, such as oxybutynin and benzatropine    Begin an exercise program. Ask your healthcare provider how to get started. You can benefit from simple activities, such as walking or gardening.    Break the smoking habit. Enroll in a stop-smoking program to improve your chances of success.    Limit alcohol intake to no more than 2 drinks a day.    Take your medicines exactly as directed. Don t skip doses.    Avoid over-the-counter nonsteroidal anti-inflammatory medicines, such as aspirin and ibuprofen, unless recommended by your healthcare provider for certain conditions.     If possible, avoid nitrates (heart medicines, such as nitroglycerin and isosorbide dinitrate ).  Follow-up care  Make a follow-up appointment as directed by our staff.     When to call the  healthcare provider  Call your healthcare provider immediately if you have any of the following:    Trouble swallowing    Pain when swallowing    Feeling of food caught in your chest or throat    Pain in the neck, chest, or back    Heartburn that causes you to vomit    Vomiting blood    Black or tarry stools (from digested blood)    More saliva (watering of the mouth) than usual    Weight loss of more than 3% to 5% of your total body weight in a month    Hoarseness or sore throat that won t go away    Choking, coughing, or wheezing   Date Last Reviewed: 7/1/2016 2000-2017 [employer]. [address] [place] [address]. All rights reserved. This information is not intended as a substitute for professional medical care. Always follow your healthcare professional's instructions.            Nicole Joy Siegler, PA-C  [facility]

## 2017-09-05 NOTE — NURSING NOTE
"Chief Complaint   Patient presents with     Ear Problem     Ear feels clogged, brown discharge X3 weeks     Gastrointestinal Problem     Increased gas and burping. Has appt. scheduled with GI. Using Phazyme with some relief.       Initial There were no vitals taken for this visit. Estimated body mass index is 32.87 kg/(m^2) as calculated from the following:    Height as of 4/10/17: 6' 3\" (1.905 m).    Weight as of 5/19/17: 263 lb (119.3 kg).  Medication Reconciliation: complete     Samara Marquez LPN  "

## 2017-09-18 DIAGNOSIS — H66.002 ACUTE SUPPURATIVE OTITIS MEDIA OF LEFT EAR WITHOUT SPONTANEOUS RUPTURE OF TYMPANIC MEMBRANE, RECURRENCE NOT SPECIFIED: ICD-10-CM

## 2017-09-18 RX ORDER — AMOXICILLIN 875 MG
TABLET ORAL
Qty: 20 TABLET | Refills: 0 | Status: SHIPPED | OUTPATIENT
Start: 2017-09-18 | End: 2017-10-09

## 2017-09-18 NOTE — TELEPHONE ENCOUNTER
Amoxicillin    Call made to patient : he still is with sx's of otitis externa and media. Per note of Nicole Siegler, PA patient to be seen if not better. Prefers to wait to see Dr. Eze Strong -first opening is Thurs 9-21-17.      Last Written Prescription Date:  9-5-17  Last Fill Quantity: 20,   # refills: 0  Last Office Visit with [facility], [facility] or [facility] prescribing provider: 9-5-17  Future Office visit:       Routing refill request to provider for review/approval because:  Drug not on the [facility], [facility] or [facility] refill protocol or controlled substance

## 2017-09-18 NOTE — TELEPHONE ENCOUNTER
I will send refill to have him start taking, but he should schedule appt to be seen for recheck, especially if not improving

## 2017-10-06 DIAGNOSIS — M1A.00X0 IDIOPATHIC CHRONIC GOUT WITHOUT TOPHUS, UNSPECIFIED SITE: ICD-10-CM

## 2017-10-06 RX ORDER — ALLOPURINOL 100 MG/1
TABLET ORAL
Qty: 90 TABLET | Refills: 1 | Status: CANCELLED | OUTPATIENT
Start: 2017-10-06

## 2017-10-09 ENCOUNTER — OFFICE VISIT (OUTPATIENT)
Dept: FAMILY MEDICINE | Facility: CLINIC | Age: 57
End: 2017-10-09
Payer: COMMERCIAL

## 2017-10-09 VITALS
RESPIRATION RATE: 20 BRPM | OXYGEN SATURATION: 96 % | DIASTOLIC BLOOD PRESSURE: 90 MMHG | HEART RATE: 69 BPM | SYSTOLIC BLOOD PRESSURE: 138 MMHG | WEIGHT: 262.5 LBS | TEMPERATURE: 98.3 F | BODY MASS INDEX: 32.81 KG/M2

## 2017-10-09 DIAGNOSIS — M79.641 RIGHT HAND PAIN: ICD-10-CM

## 2017-10-09 DIAGNOSIS — M25.532 LEFT WRIST PAIN: ICD-10-CM

## 2017-10-09 DIAGNOSIS — M1A.00X0 IDIOPATHIC CHRONIC GOUT WITHOUT TOPHUS, UNSPECIFIED SITE: ICD-10-CM

## 2017-10-09 LAB
ERYTHROCYTE [SEDIMENTATION RATE] IN BLOOD BY WESTERGREN METHOD: 16 MM/H (ref 0–20)
URATE SERPL-MCNC: 7.5 MG/DL (ref 3.5–7.2)

## 2017-10-09 PROCEDURE — 99213 OFFICE O/P EST LOW 20 MIN: CPT | Performed by: FAMILY MEDICINE

## 2017-10-09 PROCEDURE — 84550 ASSAY OF BLOOD/URIC ACID: CPT | Performed by: FAMILY MEDICINE

## 2017-10-09 PROCEDURE — 85652 RBC SED RATE AUTOMATED: CPT | Performed by: FAMILY MEDICINE

## 2017-10-09 PROCEDURE — 36415 COLL VENOUS BLD VENIPUNCTURE: CPT | Performed by: FAMILY MEDICINE

## 2017-10-09 RX ORDER — INDOMETHACIN 50 MG/1
CAPSULE ORAL
Qty: 42 CAPSULE | Refills: 1 | Status: SHIPPED | OUTPATIENT
Start: 2017-10-09 | End: 2021-07-06

## 2017-10-09 RX ORDER — PREDNISONE 10 MG/1
TABLET ORAL
Qty: 12 TABLET | Refills: 0 | Status: SHIPPED | OUTPATIENT
Start: 2017-10-09 | End: 2019-03-25

## 2017-10-09 RX ORDER — ALLOPURINOL 100 MG/1
100 TABLET ORAL DAILY
Qty: 90 TABLET | Refills: 1 | Status: SHIPPED | OUTPATIENT
Start: 2017-10-09 | End: 2017-12-20

## 2017-10-09 NOTE — TELEPHONE ENCOUNTER
allopurinol (ZYLOPRIM) 100 MG tablet    Last Written Prescription Date: 2/21/17  Last Fill Quantity: 90, # refills: 1  Last Office Visit with [facility] or [facility] prescribing provider:  10/9/17        Uric Acid   Date Value Ref Range Status   04/10/2017 8.0 (H) 3.5 - 7.2 mg/dL Final   ]  Creatinine   Date Value Ref Range Status   04/10/2017 1.79 (H) 0.66 - 1.25 mg/dL Final   ]  Lab Results   Component Value Date    WBC 5.0 03/10/2014     Lab Results   Component Value Date    RBC 3.92 03/10/2014     Lab Results   Component Value Date    HGB 8.6 03/22/2014     Lab Results   Component Value Date    HCT 33.1 03/10/2014     No components found for: MCT  Lab Results   Component Value Date    MCV 84 03/10/2014     Lab Results   Component Value Date    MCH 27.3 03/10/2014     Lab Results   Component Value Date    MCHC 32.3 03/10/2014     Lab Results   Component Value Date    RDW 12.9 03/10/2014     Lab Results   Component Value Date     03/21/2014     Lab Results   Component Value Date    AST 20 04/10/2017     Lab Results   Component Value Date    ALT 37 04/10/2017

## 2017-10-09 NOTE — NURSING NOTE
"Chief Complaint   Patient presents with     Arthritis     LT hand, middle and ring finger       Initial /90 (BP Location: Left arm, Patient Position: Chair, Cuff Size: Adult Large)  Pulse 69  Temp 98.3  F (36.8  C) (Oral)  Resp 20  Wt 262 lb 8 oz (119.1 kg)  SpO2 96%  BMI 32.81 kg/m2 Estimated body mass index is 32.81 kg/(m^2) as calculated from the following:    Height as of 9/5/17: 6' 3\" (1.905 m).    Weight as of this encounter: 262 lb 8 oz (119.1 kg).  Medication Reconciliation: complete     Princess MONICA Otoole CMA      "

## 2017-10-09 NOTE — PROGRESS NOTES
SUBJECTIVE:   Fabian Rod is a 57 year old male who presents to clinic today for the following health issues:      Requesting Return to Work letter with possible restrictions.    Gout  Duration: x 8 days  Description   Location: fingers - left, 3rd and 4th fingers  Joint Swelling: YES  Redness: no   Pain intensity:  Moderate  Accompanying signs and symptoms: None  History  Previous history of gout: YES   Trauma to the area: no   Precipitating factors:   Alcohol usage: none  Diuretic use: no   Recent illness: YES cold  Therapies tried and outcome: Allopurinol  Pt has been icing hand.  He has continued to take the Allopurinol.  He has been out of the Indomethacin and Prednisone for some time    Pt is scheduled to return to work on Wed, he needs note      Problem list and histories reviewed & adjusted, as indicated.  Additional history: as documented    Labs reviewed in EPIC    Reviewed and updated as needed this visit by clinical staffTobacco  Allergies  Meds  Med Hx  Surg Hx  Fam Hx  Soc Hx      Reviewed and updated as needed this visit by Provider         ROS:  CONSTITUTIONAL:NEGATIVE for fever, chills, change in weight  MUSCULOSKELETAL: POSITIVE  for arthralgias Lt hand 3-4 fingers and Hx gout    OBJECTIVE:                                                    /90 (BP Location: Left arm, Patient Position: Chair, Cuff Size: Adult Large)  Pulse 69  Temp 98.3  F (36.8  C) (Oral)  Resp 20  Wt 262 lb 8 oz (119.1 kg)  SpO2 96%  BMI 32.81 kg/m2  Body mass index is 32.81 kg/(m^2).  GENERAL APPEARANCE: healthy, alert and no distress  MS: extremities normal- no gross deformities noted  ORTHO: Hand/Finger Exam: Inspection:Long Finger:  slight swelling, RIng Finger:  slight swelling  Tender: Long Finger:   PIP joint, RIng Finger:   PIP joint  Non-tender: All Normal, remainder of hand  Range of Motion Long Finger:   flexion PIP   decreased, painful, RIng Finger:   flexion PIP   decreased, painful  Strength:  full strength, remainder of hand  Special tests: none          Diagnostic test results:  Lab: see below, results pending       ASSESSMENT/PLAN:                                                        ICD-10-CM    1. Right hand pain M79.641 indomethacin (INDOCIN) 50 MG capsule   2. Left wrist pain M25.532 predniSONE (DELTASONE) 10 MG tablet   3. Idiopathic chronic gout without tophus, unspecified site M1A.00X0 predniSONE (DELTASONE) 10 MG tablet     Uric acid     ESR: Erythrocyte sedimentation rate     allopurinol (ZYLOPRIM) 100 MG tablet       Follow up with Provider - as needed if not improving  Letter generated for Pt reduced work, limited use of Lt hand for 1 week   Patient Instructions   Ice packs to Rt hand as needed      Eze Strong MD  [facility]

## 2017-10-09 NOTE — LETTER
October 9, 2017      Fabian Rod  [address] [place] [address]        To Whom It May Concern:    Fabian Rod was seen in our clinic. He may return to work with the following: limited to light duty - minimal use of Lt hand  on or about Wed 10/11/17 for 1 week.  Then may return to full duty without restrictions      Sincerely,        Eze Strong MD

## 2017-10-09 NOTE — MR AVS SNAPSHOT
"              After Visit Summary   10/9/2017    Fabian Rod    MRN: [id number]           Patient Information     Date Of Birth          1960        Visit Information        Provider Department      10/9/2017 9:30 AM Eze Strong MD [facility]        Today's Diagnoses     Right hand pain        Left wrist pain        Idiopathic chronic gout without tophus, unspecified site          Care Instructions    Ice packs to Rt hand as needed          Follow-ups after your visit        Who to contact     If you have questions or need follow up information about today's clinic visit or your schedule [facility] at [phone].  Normal or non-critical lab and imaging results will be communicated to you by MyChart, letter or phone within 4 business days after the clinic has received the results. If you do not hear from us within 7 days, please contact the clinic through MyChart or phone. If you have a critical or abnormal lab result, we will notify you by phone as soon as possible.  Submit refill requests through [employer] or call your pharmacy and they will forward the refill request to us. Please allow 3 business days for your refill to be completed.          Additional Information About Your Visit        MyChart Information     [employer] lets you send messages to your doctor, view your test results, renew your prescriptions, schedule appointments and more. To sign up, go to [email or web address] . Click on \"Log in\" on the left side of the screen, which will take you to the Welcome page. Then click on \"Sign up Now\" on the right side of the page.     You will be asked to enter the access code listed below, as well as some personal information. Please follow the directions to create your username and password.     Your access code is: [email or web address]  Expires: 2017 10:54 AM     Your access code will  in 90 days. If you need help or " a new code, please call your [place] clinic or [phone].        Care EveryWhere ID     This is your Care EveryWhere ID. This could be used by other organizations to access your [place] medical records  [id number]        Your Vitals Were     Pulse Temperature Respirations Pulse Oximetry BMI (Body Mass Index)       69 98.3  F (36.8  C) (Oral) 20 96% 32.81 kg/m2        Blood Pressure from Last 3 Encounters:   10/09/17 138/90   09/05/17 138/82   05/19/17 150/88    Weight from Last 3 Encounters:   10/09/17 262 lb 8 oz (119.1 kg)   09/05/17 260 lb (117.9 kg)   05/19/17 263 lb (119.3 kg)              We Performed the Following     ESR: Erythrocyte sedimentation rate     Uric acid          Today's Medication Changes          These changes are accurate as of: 10/9/17  9:45 AM.  If you have any questions, ask your nurse or doctor.               These medicines have changed or have updated prescriptions.        Dose/Directions    indomethacin 50 MG capsule   Commonly known as:  INDOCIN   This may have changed:  See the new instructions.   Used for:  Right hand pain   Changed by:  Eze Strong MD        TAKE ONE CAPSULE BY MOUTH THREE TIMES DAILY WITH MEALS.   Quantity:  42 capsule   Refills:  1            Where to get your medicines      These medications were sent to [id number] - [place], [place] - [address]  [address] [place] [address]     Phone:  [phone]     allopurinol 100 MG tablet    indomethacin 50 MG capsule    predniSONE 10 MG tablet                Primary Care Provider Office Phone # Fax #    Eze Strong [phone] [address] [place] [address]        Equal Access to Services     [facility]: Hadii sharif ku hadasho Soomaali, waaxda luqadaha, qaybta kaalmada adeegyada, breanna aceves. So [facility] [phone].    ATENCIÓN: Si habla español, tiene a sorenson disposición servicios gratuitos de asistencia lingüística. Llame al  [phone].    We comply with applicable federal civil rights laws and [place] laws. We do not discriminate on the basis of race, color, national origin, age, disability, sex, sexual orientation, or gender identity.            Thank you!     Thank you for choosing [facility]  for your care. Our goal is always to provide you with excellent care. Hearing back from our patients is one way we can continue to improve our services. Please take a few minutes to complete the written survey that you may receive in the mail after your visit with us. Thank you!             Your Updated Medication List - Protect others around you: Learn how to safely use, store and throw away your medicines at www.disposemymeds.org.          This list is accurate as of: 10/9/17  9:45 AM.  Always use your most recent med list.                   Brand Name Dispense Instructions for use Diagnosis    allopurinol 100 MG tablet    ZYLOPRIM    90 tablet    Take 1 tablet (100 mg) by mouth daily    Idiopathic chronic gout without tophus, unspecified site       fluticasone 50 MCG/ACT spray    FLONASE    1 Package    Spray 2 sprays into both nostrils daily    Bad breath       indomethacin 50 MG capsule    INDOCIN    42 capsule    TAKE ONE CAPSULE BY MOUTH THREE TIMES DAILY WITH MEALS.    Right hand pain       lisinopril-hydrochlorothiazide 20-12.5 MG per tablet    PRINZIDE/ZESTORETIC    180 tablet    TAKE 2 TABLETS DAILY    Essential hypertension, benign       MULTIVITAMIN PO      Take by mouth daily Reported on 4/10/2017        predniSONE 10 MG tablet    DELTASONE    12 tablet    Take 2 daily for 2 days, then 1 daily for 5 days, then 1/2 daily    Left wrist pain, Idiopathic chronic gout without tophus, unspecified site       sildenafil 100 MG tablet    VIAGRA    6 tablet    Take 1 tablet (100 mg) by mouth every 3 days    Other male erectile dysfunction

## 2017-10-09 NOTE — LETTER
October 9, 2017      Fabian Rod  [address]   [place] [address]        Dear ,    We are writing to inform you of your test results.    Uric acid level improving but still elevated.  Wait for 3-4 weeks after current gout flare up has subsided, then increase the Allopurinol to 2 tabs (200 mg ) daily  Sedimentation rate is normal, no inflammation showing with this test    Resulted Orders   Uric acid   Result Value Ref Range    Uric Acid 7.5 (H) 3.5 - 7.2 mg/dL   ESR: Erythrocyte sedimentation rate   Result Value Ref Range    Sed Rate 16 0 - 20 mm/h       If you have any questions or concerns, please call the clinic at the number listed above.       Sincerely,        Eze Strong MD

## 2017-12-19 ENCOUNTER — TELEPHONE (OUTPATIENT)
Dept: FAMILY MEDICINE | Facility: CLINIC | Age: 57
End: 2017-12-19

## 2017-12-19 NOTE — TELEPHONE ENCOUNTER
12/19/2017    Call Regarding Preventive Health Screening Colonoscopy    Attempt 1    Message on voicemail     Comments:       Outreach   SB

## 2017-12-20 ENCOUNTER — TELEPHONE (OUTPATIENT)
Dept: BEHAVIORAL HEALTH | Facility: CLINIC | Age: 57
End: 2017-12-20

## 2017-12-20 DIAGNOSIS — Z12.11 SPECIAL SCREENING FOR MALIGNANT NEOPLASMS, COLON: Primary | ICD-10-CM

## 2017-12-20 DIAGNOSIS — M1A.00X0 IDIOPATHIC CHRONIC GOUT WITHOUT TOPHUS, UNSPECIFIED SITE: ICD-10-CM

## 2017-12-20 NOTE — TELEPHONE ENCOUNTER
12/20/2017    FIT  Called the patient for Health Maintenance, agreed to FIT test screening. Order placed for provider to sign.     The patient request FIT be mailed to: Home      [address] [address]

## 2017-12-20 NOTE — TELEPHONE ENCOUNTER
12/20/2017      Patient return call and agree to do a FIT test.        Outreach ,  Chris Patricia

## 2017-12-20 NOTE — LETTER
December 21, 2017      Fabian Rod  [address] [place] [address]        Dear Fabian,       Enclosed is the FIT test. There are a few instructions that need to be followed to make sure this sample will be tested correctly.    1) please do not mail in outside mail box when completed due to the cold weather. You may either mail in from an inside mail box OR bring the sample back to us in the envelope provided.  2) please leave the order that is inside of the envelope, this is for the [facility] to know who the sample came from.  3) you may collect the same from the toilet paper you use to wipe with, this will be much easier to get.  4) please be sure to write the date of the day you collected your sample, if it is not written on there, they will throw it away as it must be tested within 30 days of collection.    If you have any other questions or concerns, please call us at [phone]    Sincerely,        Eze Strong MD

## 2017-12-20 NOTE — TELEPHONE ENCOUNTER
//Requested Prescriptions   Pending Prescriptions Disp Refills     allopurinol (ZYLOPRIM) 100 MG tablet  Last Written Prescription Date:  10/9/17  Last Fill Quantity: 90,  # refills: 1   Last Office Visit with [facility] or [facility] prescribing provider:  10/9/17   Future Office Visit:    90 tablet 1     Sig: Take 1 tablet (100 mg) by mouth daily    Gout Agents Protocol Failed    12/20/2017  2:49 PM       Failed - CBC on file in past 12 months    Recent Labs   Lab Test  03/22/14   0549  03/21/14   0559   03/10/14   1323   WBC   --    --    --   5.0   RBC   --    --    --   3.92*   HGB  8.6*  9.1*   < >  10.7*   HCT   --    --    --   33.1*   PLT   --   183   < >  257    < > = values in this interval not displayed.            Failed - Normal serum creatinine on file in the past 12 months    Recent Labs   Lab Test  04/10/17   1442   CR  1.79*            Passed - ALT on file in past 12 months    Recent Labs   Lab Test  04/10/17   1442   ALT  37            Passed - Uric acid on file in past 12 months    Recent Labs   Lab Test  10/09/17   0945   URIC  7.5*     If level is 6mg/dL or greater, ok to refill one time and refer to provider.          Passed - Recent or future visit with authorizing provider's specialty    Patient had office visit in the last year or has a visit in the next 30 days with authorizing provider.  See chart review.              Passed - Patient is age 18 or older

## 2017-12-21 NOTE — TELEPHONE ENCOUNTER
I have been told that we can mail fit tests.  Bring pt info to the lab and they will mail out, thanks.

## 2017-12-21 NOTE — TELEPHONE ENCOUNTER
Called pt to let him know that he can stop by the lab and  the FIT test whenever he is able to. Left VM.

## 2017-12-22 RX ORDER — ALLOPURINOL 100 MG/1
100 TABLET ORAL DAILY
Qty: 90 TABLET | Refills: 1 | Status: SHIPPED | OUTPATIENT
Start: 2017-12-22 | End: 2018-06-20

## 2017-12-22 NOTE — TELEPHONE ENCOUNTER
Routing refill request to provider for review/approval because:  Labs out dated  Angelia Gore RN- Triage [employer]

## 2018-01-02 ENCOUNTER — TELEPHONE (OUTPATIENT)
Dept: FAMILY MEDICINE | Facility: CLINIC | Age: 58
End: 2018-01-02

## 2018-01-02 DIAGNOSIS — K21.9 GASTROESOPHAGEAL REFLUX DISEASE WITHOUT ESOPHAGITIS: Primary | ICD-10-CM

## 2018-01-02 NOTE — TELEPHONE ENCOUNTER
Patient was called- he has tried the Zantac suggested by Nicole Siegler, PA in Sept 2017 without any relief of his sx's of GERD.  Patient did not go to gastroenterology consult because (?).  Patient is asking for a prescription for another PPI without another OV.  Pharmacy t'd up.

## 2018-01-02 NOTE — TELEPHONE ENCOUNTER
Reason for Call:  Other call back  Detailed comments: patient would like medication for gerd  Phone Number Patient can be reached at: Home number on file [phone] (home)  Best Time: any  Can we leave a detailed message on this number? YES  Call taken on 1/2/2018 at 9:25 AM by LGOAN ROSE

## 2018-01-03 PROBLEM — K21.9 GASTROESOPHAGEAL REFLUX DISEASE WITHOUT ESOPHAGITIS: Status: ACTIVE | Noted: 2018-01-03

## 2018-01-03 NOTE — TELEPHONE ENCOUNTER
I will do Rx for Omeprazole 20 mg daily for him to take for a month.  Then he should schedule recheck appt  Rx sent to his [facility] pharmacy

## 2018-02-21 DIAGNOSIS — K21.9 GASTROESOPHAGEAL REFLUX DISEASE WITHOUT ESOPHAGITIS: ICD-10-CM

## 2018-02-22 NOTE — TELEPHONE ENCOUNTER
"Requested Prescriptions   Pending Prescriptions Disp Refills     omeprazole (PRILOSEC) 20 MG CR capsule  Last Written Prescription Date:  1/3/18  Last Fill Quantity: 30,  # refills: 1   Last office visit: 10/9/2017 with prescribing provider:  Ligia   Future Office Visit:     30 capsule 1     Sig: Take 1 capsule (20 mg) by mouth daily    PPI Protocol Passed    2/21/2018 10:41 AM       Passed - Not on Clopidogrel (unless Pantoprazole ordered)       Passed - No diagnosis of osteoporosis on record       Passed - Recent or future visit with authorizing provider's specialty    Patient had office visit in the last year or has a visit in the next 30 days with authorizing provider.  See \"Patient Info\" tab in inbasket, or \"Choose Columns\" in Meds & Orders section of the refill encounter.            Passed - Patient is age 18 or older          "

## 2018-02-22 NOTE — TELEPHONE ENCOUNTER
Per note on 1/2/18 patient to follow up after last refill.     Call made to patient without answer. He is to schedule an appointment.

## 2018-02-27 NOTE — TELEPHONE ENCOUNTER
Patient Contact    Attempt # 2    Was call answered?  No.  Left non-detailed msg on VM that he need an appointment before this can be refilled

## 2018-04-11 DIAGNOSIS — K21.9 GASTROESOPHAGEAL REFLUX DISEASE WITHOUT ESOPHAGITIS: ICD-10-CM

## 2018-04-11 NOTE — TELEPHONE ENCOUNTER
"Requested Prescriptions   Pending Prescriptions Disp Refills     omeprazole (PRILOSEC) 20 MG CR capsule [Pharmacy Med Name: OMEPRAZOLE CAPS 20MG] 30 capsule 1    Last Written Prescription Date:  02/27/2018  Last Fill Quantity: 30,  # refills: 1   Last office visit: 10/9/2017 with prescribing provider:  Dr. Eze Strong   Future Office Visit:   Sig: TAKE 1 CAPSULE DAILY    PPI Protocol Passed    4/11/2018 12:12 AM       Passed - Not on Clopidogrel (unless Pantoprazole ordered)       Passed - No diagnosis of osteoporosis on record       Passed - Recent (12 mo) or future (30 days) visit within the authorizing provider's specialty    Patient had office visit in the last 12 months or has a visit in the next 30 days with authorizing provider or within the authorizing provider's specialty.  See \"Patient Info\" tab in inbasket, or \"Choose Columns\" in Meds & Orders section of the refill encounter.           Passed - Patient is age 18 or older        "

## 2018-04-20 ENCOUNTER — OFFICE VISIT (OUTPATIENT)
Dept: FAMILY MEDICINE | Facility: CLINIC | Age: 58
End: 2018-04-20
Payer: COMMERCIAL

## 2018-04-20 VITALS
HEART RATE: 70 BPM | RESPIRATION RATE: 20 BRPM | DIASTOLIC BLOOD PRESSURE: 90 MMHG | SYSTOLIC BLOOD PRESSURE: 150 MMHG | WEIGHT: 266 LBS | BODY MASS INDEX: 33.25 KG/M2 | TEMPERATURE: 98.2 F | OXYGEN SATURATION: 100 %

## 2018-04-20 DIAGNOSIS — K21.9 GASTROESOPHAGEAL REFLUX DISEASE WITHOUT ESOPHAGITIS: ICD-10-CM

## 2018-04-20 DIAGNOSIS — M70.21 OLECRANON BURSITIS OF RIGHT ELBOW: Primary | ICD-10-CM

## 2018-04-20 PROCEDURE — 99214 OFFICE O/P EST MOD 30 MIN: CPT | Performed by: FAMILY MEDICINE

## 2018-04-20 NOTE — MR AVS SNAPSHOT
After Visit Summary   4/20/2018    Fabian Rod    MRN: [id number]           Patient Information     Date Of Birth          1960        Visit Information        Provider Department      4/20/2018 4:00 PM Eze Strong MD [facility]        Today's Diagnoses     Olecranon bursitis of right elbow    -  1    Gastroesophageal reflux disease without esophagitis          Care Instructions    Ice packs frequently for next week, then alternate ice & heat.    Do gentle Range of motion exercises          Follow-ups after your visit        Additional Services     GASTROENTEROLOGY ADULT REF PROCEDURE ONLY Other; [place] [facility] [phone]       Last Lab Result: Creatinine (mg/dL)       Date                     Value                 04/10/2017               1.79 (H)         ----------  Body mass index is 33.25 kg/(m^2).     Needed:  No  Language:  English    Patient will be contacted to schedule procedure.     Please be aware that coverage of these services is subject to the terms and limitations of your health insurance plan.  Call member services at your health plan with any benefit or coverage questions.  Any procedures must be performed at a [facility] facility OR coordinated by your clinic's referral office.    Please bring the following with you to your appointment:    (1) Any X-Rays, CTs or MRIs which have been performed.  Contact the facility where they were done to arrange for  prior to your scheduled appointment.    (2) List of current medications   (3) This referral request   (4) Any documents/labs given to you for this referral                  Who to contact     If you have questions or need follow up information about today's clinic visit or your schedule please contact [facility] directly at [phone].  Normal or non-critical lab and imaging results will be communicated to you by MyChart, letter or phone within 4 business  "days after the clinic has received the results. If you do not hear from us within 7 days, please contact the clinic through [email or web address] or phone. If you have a critical or abnormal lab result, we will notify you by phone as soon as possible.  Submit refill requests through [email or web address] or call your pharmacy and they will forward the refill request to us. Please allow 3 business days for your refill to be completed.          Additional Information About Your Visit        [id number] Information     [email or web address] lets you send messages to your doctor, view your test results, renew your prescriptions, schedule appointments and more. To sign up, go to [email or web address] . Click on \"Log in\" on the left side of the screen, which will take you to the Welcome page. Then click on \"Sign up Now\" on the right side of the page.     You will be asked to enter the access code listed below, as well as some personal information. Please follow the directions to create your username and password.     Your access code is: [email or web address]  Expires: 2018  4:03 PM     Your access code will  in 90 days. If you need help or a new code, please call your [place] clinic or [phone].        Care EveryWhere ID     This is your Care EveryWhere ID. This could be used by other organizations to access your [place] medical records  [id number]        Your Vitals Were     Pulse Temperature Respirations Pulse Oximetry BMI (Body Mass Index)       70 98.2  F (36.8  C) (Tympanic) 20 100% 33.25 kg/m2        Blood Pressure from Last 3 Encounters:   18 150/90   10/09/17 138/90   17 138/82    Weight from Last 3 Encounters:   18 266 lb (120.7 kg)   10/09/17 262 lb 8 oz (119.1 kg)   17 260 lb (117.9 kg)              We Performed the Following     GASTROENTEROLOGY ADULT REF PROCEDURE ONLY Other; [place] [facility] [phone]          Today's Medication Changes          These changes are accurate as of 18  4:03 PM.  If you have any questions, " ask your nurse or doctor.               Stop taking these medicines if you haven't already. Please contact your care team if you have questions.     omeprazole 20 MG CR capsule   Commonly known as:  priLOSEC   Stopped by:  Eze Strong MD                    Primary Care Provider Office Phone # Fax #    Eze Strong [phone]       [address] [facility] [address]        Equal Access to Services     [facility]: Hadii aad ku hadasho Soomaali, waaxda luqadaha, qaybta kaalmada adeegyada, waxay idiin hayaan adeeg kharash la'aan . So [facility] [phone].    ATENCIÓN: Si wanla español, tiene a sorenson disposición servicios gratuitos de asistencia lingüística. [facility] [phone].    We comply with applicable federal civil rights laws and [place] laws. We do not discriminate on the basis of race, color, national origin, age, disability, sex, sexual orientation, or gender identity.            Thank you!     Thank you for choosing [facility]  for your care. Our goal is always to provide you with excellent care. Hearing back from our patients is one way we can continue to improve our services. Please take a few minutes to complete the written survey that you may receive in the mail after your visit with us. Thank you!             Your Updated Medication List - Protect others around you: Learn how to safely use, store and throw away your medicines at www.disposemymeds.org.          This list is accurate as of 4/20/18  4:03 PM.  Always use your most recent med list.                   Brand Name Dispense Instructions for use Diagnosis    allopurinol 100 MG tablet    ZYLOPRIM    90 tablet    Take 1 tablet (100 mg) by mouth daily    Idiopathic chronic gout without tophus, unspecified site       fluticasone 50 MCG/ACT spray    FLONASE    1 Package    Spray 2 sprays into both nostrils daily    Bad breath       indomethacin 50 MG capsule    INDOCIN    42 capsule    TAKE  ONE CAPSULE BY MOUTH THREE TIMES DAILY WITH MEALS.    Right hand pain       lisinopril-hydrochlorothiazide 20-12.5 MG per tablet    PRINZIDE/ZESTORETIC    180 tablet    TAKE 2 TABLETS DAILY    Essential hypertension, benign       MULTIVITAMIN PO      Take by mouth daily Reported on 4/10/2017        predniSONE 10 MG tablet    DELTASONE    12 tablet    Take 2 daily for 2 days, then 1 daily for 5 days, then 1/2 daily    Left wrist pain, Idiopathic chronic gout without tophus, unspecified site       VIAGRA 100 MG tablet   Generic drug:  sildenafil     6 tablet    TAKE 1 TABLET EVERY 3 DAYS    Other male erectile dysfunction

## 2018-04-20 NOTE — PATIENT INSTRUCTIONS
Ice packs frequently for next week, then alternate ice & heat.    Do gentle Range of motion exercises

## 2018-04-20 NOTE — LETTER
April 20, 2018      Fabian Rod  [address] [facility] [address]        To Whom It May Concern:    Fabian Rod was seen in our clinic. He may return to work with the following: limited to light duty - lifting no greater than 20 pounds on or about Saturday 4/21/18. For the next week.  After that he may work without restrictions      Sincerely,        Eze Strong MD

## 2018-04-20 NOTE — PROGRESS NOTES
SUBJECTIVE:   Fabian Rod is a 57 year old male who presents to clinic today for the following health issues:    Additional concerns: GERD. Prilosec has not been effective.    Joint Pain    Onset: x 6 days    Description:   Location: right elbow  Character: Burning    Intensity: moderate    Progression of Symptoms: better    Accompanying Signs & Symptoms:  Other symptoms: warmth and swelling, RT thumb    History:   Previous similar pain: no       Precipitating factors:   Trauma or overuse: YES    Alleviating factors:  Improved by: ice    Therapies Tried and outcome: Advil and ice    Was shoveling snow and almost slipped. Tried to prevent from falling and hit elbow on side of house.  Pain in Rt elbow with bending and lifting    GERD/Heartburn  Onset: ongoing    Description:     Burning in chest: no     Intensity: mild, severe    Progression of Symptoms: same    Accompanying Signs & Symptoms:  Does it feel like food gets stuck: no   Nausea: no   Vomiting (bloody?): no   Abdominal Pain: no   Black-Tarry stools: no :  Bloody stools: no     History:   Previous ulcers: no     Precipitating factors:   Caffeine use: YES  Alcohol use: YES  NSAID/Aspirin use: YES  Tobacco use: no   Worse with spicy foods.    Alleviating factors:  water    Therapies Tried and outcome:avoids spicy foods,omeprazole and ranitidine  Only some improvement with medications, but symptoms contineu    Problem list and histories reviewed & adjusted, as indicated.  Additional history: as documented    Labs reviewed in EPIC    Reviewed and updated as needed this visit by clinical staff       Reviewed and updated as needed this visit by Provider         ROS:  CONSTITUTIONAL:NEGATIVE for fever, chills, change in weight  GI: POSITIVE for gas or bloating, heartburn or reflux and Hx GERD and NEGATIVE for constipation, diarrhea, nausea and vomiting  MUSCULOSKELETAL: POSITIVE  for injury to Rt elbow    OBJECTIVE:                                                     /90 (BP Location: Right arm, Patient Position: Sitting, Cuff Size: Adult Large)  Pulse 70  Temp 98.2  F (36.8  C) (Tympanic)  Resp 20  Wt 266 lb (120.7 kg)  SpO2 100%  BMI 33.25 kg/m2  Body mass index is 33.25 kg/(m^2).  GENERAL APPEARANCE: healthy, alert and no distress  ABDOMEN: soft, nontender, without hepatosplenomegaly or masses and bowel sounds normal  MS: extremities normal- no gross deformities noted  ORTHO: Elbow Exam: Inspection: olecranon bursa swelling  Tender: olecranon bursa  Non-tender: lateral epicondyle and medial epicondyle  Range of Motion: all normal  Strength: elbow strength full  Special tests: normal stability:       Diagnostic test results:  none      ASSESSMENT/PLAN:                                                        ICD-10-CM    1. Olecranon bursitis of right elbow M70.21    2. Gastroesophageal reflux disease without esophagitis K21.9 GASTROENTEROLOGY ADULT REF PROCEDURE ONLY Other; [place] [facility] [phone]       Follow up with Provider - 2 mo or as needed   Patient Instructions   Ice packs frequently for next week, then alternate ice & heat.    Do gentle Range of motion exercises      Eze Strong MD  [facility]

## 2018-05-07 ENCOUNTER — TRANSFERRED RECORDS (OUTPATIENT)
Dept: HEALTH INFORMATION MANAGEMENT | Facility: CLINIC | Age: 58
End: 2018-05-07

## 2018-05-08 ENCOUNTER — TELEPHONE (OUTPATIENT)
Dept: FAMILY MEDICINE | Facility: CLINIC | Age: 58
End: 2018-05-08

## 2018-05-08 NOTE — TELEPHONE ENCOUNTER
Reason for Call:  Other call back    Detailed comments:    Patient saw gastroenterologist yesterday 05/07/2018    He is wondering what the next step for him is   What does Dr Strong want to prescribe   Patient knows Dr Strong needs to see the report first.    Please call him with follow up instructions    Phone Number Patient can be reached at: Home number on file [phone] (home)    Best Time:    anytime    Can we leave a detailed message on this number? YES    Call taken on 5/8/2018 at 12:06 PM by ARDEN CALLAHAN

## 2018-05-08 NOTE — TELEPHONE ENCOUNTER
It will take a few days before the consult report is sent to me, then I will let Pt know what the gastroenterologist is recommending

## 2018-05-08 NOTE — TELEPHONE ENCOUNTER
Call back to patient and let him know that as soon as provider receives report he will be contacted with recommendations

## 2018-05-11 DIAGNOSIS — I10 ESSENTIAL HYPERTENSION, BENIGN: ICD-10-CM

## 2018-05-14 RX ORDER — LISINOPRIL AND HYDROCHLOROTHIAZIDE 12.5; 2 MG/1; MG/1
TABLET ORAL
Qty: 60 TABLET | Refills: 0 | Status: SHIPPED | OUTPATIENT
Start: 2018-05-14 | End: 2019-04-30

## 2018-05-14 NOTE — TELEPHONE ENCOUNTER
"Requested Prescriptions   Pending Prescriptions Disp Refills     lisinopril-hydrochlorothiazide (PRINZIDE/ZESTORETIC) 20-12.5 MG per tablet [Pharmacy Med Name: LISINOPRIL/HCTZ TABS 20/12.5MG]  Last Written Prescription Date:  11/13/17  Last Fill Quantity: 180,  # refills: 1   Last office visit: 4/20/2018 Future Office Visit:     180 tablet 1     Sig: TAKE 2 TABLETS DAILY    Diuretics (Including Combos) Protocol Failed    5/11/2018 11:18 PM       Failed - Blood pressure under 140/90 in past 12 months    BP Readings from Last 3 Encounters:   04/20/18 150/90   10/09/17 138/90   09/05/17 138/82                Failed - Normal serum creatinine on file in past 12 months    Recent Labs   Lab Test  04/10/17   1442   CR  1.79*             Failed - Normal serum potassium on file in past 12 months    Recent Labs   Lab Test  04/10/17   1442   POTASSIUM  5.0                   Failed - Normal serum sodium on file in past 12 months    Recent Labs   Lab Test  04/10/17   1442   NA  141             Passed - Recent (12 mo) or future (30 days) visit within the authorizing provider's specialty    Patient had office visit in the last 12 months or has a visit in the next 30 days with authorizing provider or within the authorizing provider's specialty.  See \"Patient Info\" tab in inbasket, or \"Choose Columns\" in Meds & Orders section of the refill encounter.           Passed - Patient is age 18 or older        Medication is being filled for 1 time refill only due to:  Future labs ordered Sodium, potassium, creatinine.    "

## 2018-05-17 ENCOUNTER — TRANSFERRED RECORDS (OUTPATIENT)
Dept: HEALTH INFORMATION MANAGEMENT | Facility: CLINIC | Age: 58
End: 2018-05-17

## 2018-05-31 ENCOUNTER — OFFICE VISIT (OUTPATIENT)
Dept: FAMILY MEDICINE | Facility: CLINIC | Age: 58
End: 2018-05-31
Payer: COMMERCIAL

## 2018-05-31 VITALS
DIASTOLIC BLOOD PRESSURE: 80 MMHG | TEMPERATURE: 98.3 F | OXYGEN SATURATION: 97 % | BODY MASS INDEX: 33.82 KG/M2 | SYSTOLIC BLOOD PRESSURE: 136 MMHG | RESPIRATION RATE: 18 BRPM | WEIGHT: 272 LBS | HEART RATE: 94 BPM | HEIGHT: 75 IN

## 2018-05-31 DIAGNOSIS — M25.562 ACUTE PAIN OF LEFT KNEE: Primary | ICD-10-CM

## 2018-05-31 PROCEDURE — 99213 OFFICE O/P EST LOW 20 MIN: CPT | Performed by: FAMILY MEDICINE

## 2018-05-31 RX ORDER — METHYLPREDNISOLONE 4 MG
TABLET, DOSE PACK ORAL
Qty: 21 TABLET | Refills: 0 | Status: SHIPPED | OUTPATIENT
Start: 2018-05-31 | End: 2019-04-30

## 2018-05-31 NOTE — NURSING NOTE
"Chief Complaint   Patient presents with     Musculoskeletal Problem     /80  Pulse 94  Temp 98.3  F (36.8  C) (Tympanic)  Resp 18  Ht 6' 3\" (1.905 m)  Wt 272 lb (123.4 kg)  SpO2 97%  BMI 34 kg/m2 Estimated body mass index is 34 kg/(m^2) as calculated from the following:    Height as of this encounter: 6' 3\" (1.905 m).    Weight as of this encounter: 272 lb (123.4 kg).  BP completed using cuff size: ting Vergara CMA    Health Maintenance Due   Topic Date Due     HIV SCREEN (SYSTEM ASSIGNED)  06/06/1976     COLON CANCER SCREEN (SYSTEM ASSIGNED)  06/06/2008     Health Maintenance reviewed at today's visit patient asked to schedule/complete:   None, Health Maintenance up to date.  Colon Cancer:  Patient agrees to schedule    "

## 2018-05-31 NOTE — MR AVS SNAPSHOT
"              After Visit Summary   5/31/2018    Fabian Rod    MRN: [id number]           Patient Information     Date Of Birth          1960        Visit Information        Provider Department      5/31/2018 2:45 PM Eze Strong MD [facility]        Today's Diagnoses     Acute pain of left knee    -  1      Care Instructions    Ice packs to knee for 3-4 days, then alternate ice & heat.  Use Ice massage on trigger point areas.  Do gentle Range of motion exercises          Follow-ups after your visit        Who to contact     If you have questions or need follow up information about today's clinic visit or your schedule please contact [facility] directly at [phone].  Normal or non-critical lab and imaging results will be communicated to you by MyChart, letter or phone within 4 business days after the clinic has received the results. If you do not hear from us within 7 days, please contact the clinic through MyChart or phone. If you have a critical or abnormal lab result, we will notify you by phone as soon as possible.  Submit refill requests through [employer] or call your pharmacy and they will forward the refill request to us. Please allow 3 business days for your refill to be completed.          Additional Information About Your Visit        Care EveryWhere ID     This is your Care EveryWhere ID. This could be used by other organizations to access your [facility] medical records  [id number]        Your Vitals Were     Pulse Temperature Respirations Height Pulse Oximetry BMI (Body Mass Index)    94 98.3  F (36.8  C) (Tympanic) 18 6' 3\" (1.905 m) 97% 34 kg/m2       Blood Pressure from Last 3 Encounters:   05/31/18 136/80   04/20/18 150/90   10/09/17 138/90    Weight from Last 3 Encounters:   05/31/18 272 lb (123.4 kg)   04/20/18 266 lb (120.7 kg)   10/09/17 262 lb 8 oz (119.1 kg)              Today, you had the following     No orders found for " display         Today's Medication Changes          These changes are accurate as of 5/31/18  2:59 PM.  If you have any questions, ask your nurse or doctor.               Start taking these medicines.        Dose/Directions    methylPREDNISolone 4 MG tablet   Commonly known as:  MEDROL DOSEPAK   Used for:  Acute pain of left knee   Started by:  Eze Strong MD        Follow package instructions   Quantity:  21 tablet   Refills:  0            Where to get your medicines      These medications were sent to [id number] - [place], [place] - [address]  [address] [place] [address]     Phone:  [phone]     methylPREDNISolone 4 MG tablet                Primary Care Provider Office Phone # Fax #    Eze Strong [phone] [address] [facility] [facility] [address]        Equal Access to Services     [place] : Hadii sharif payton hadasho Soomaali, waaxda luqadaha, qaybta kaalmada adeegyada, breanna sprague . So [facility] [phone].    ATENCIÓN: Si habla español, tiene a sorenson disposición servicios gratuitos de asistencia lingüística. [facility] [phone].    We comply with applicable federal civil rights laws and [place] laws. We do not discriminate on the basis of race, color, national origin, age, disability, sex, sexual orientation, or gender identity.            Thank you!     [facility] you [facility]. Our goal is always to provide you with excellent care. Hearing back from our patients is one way we can continue to improve our services. Please take a few minutes to complete the written survey that you may receive in the mail after your visit with us. Thank you!             Your Updated Medication List - Protect others around you: Learn how to safely use, store and throw away your medicines at www.disposemymeds.org.          This list is accurate as of 5/31/18  2:59 PM.  Always use your most recent med  list.                   Brand Name Dispense Instructions for use Diagnosis    allopurinol 100 MG tablet    ZYLOPRIM    90 tablet    Take 1 tablet (100 mg) by mouth daily    Idiopathic chronic gout without tophus, unspecified site       fluticasone 50 MCG/ACT spray    FLONASE    1 Package    Spray 2 sprays into both nostrils daily    Bad breath       indomethacin 50 MG capsule    INDOCIN    42 capsule    TAKE ONE CAPSULE BY MOUTH THREE TIMES DAILY WITH MEALS.    Right hand pain       lisinopril-hydrochlorothiazide 20-12.5 MG per tablet    PRINZIDE/ZESTORETIC    60 tablet    TAKE 2 TABLETS DAILY    Essential hypertension, benign       methylPREDNISolone 4 MG tablet    MEDROL DOSEPAK    21 tablet    Follow package instructions    Acute pain of left knee       MULTIVITAMIN PO      Take by mouth daily Reported on 4/10/2017        predniSONE 10 MG tablet    DELTASONE    12 tablet    Take 2 daily for 2 days, then 1 daily for 5 days, then 1/2 daily    Left wrist pain, Idiopathic chronic gout without tophus, unspecified site       VIAGRA 100 MG tablet   Generic drug:  sildenafil     6 tablet    TAKE 1 TABLET EVERY 3 DAYS    Other male erectile dysfunction

## 2018-05-31 NOTE — PATIENT INSTRUCTIONS
Ice packs to knee for 3-4 days, then alternate ice & heat.  Use Ice massage on trigger point areas.  Do gentle Range of motion exercises

## 2018-05-31 NOTE — LETTER
May 31, 2018      Fabian Rod  [address] [place] [address]        To Whom It May Concern:    Fabian Rod was seen in our clinic. He may return to work with the following: limited to light duty - lifting no greater than 20 pounds, no bending/stooping and no repetitive motions on or about Friday June 1, 2018  Off work Wed and Thursday 5/30 and 5/31.  Light duty for 1 week then return without restrictions      Sincerely,        Eze Strong MD

## 2018-05-31 NOTE — PROGRESS NOTES
"  SUBJECTIVE:   Fabian Rod is a 57 year old male who presents to clinic today for the following health issues:    Letter for Work    Musculoskeletal problem/pain      Duration: 05/28/18    Description  Location: Right Knee    Intensity:  severe    Accompanying signs and symptoms: swelling and weakness    History  Previous similar problem: no   Previous evaluation:  none    Precipitating or alleviating factors:  Trauma or overuse: YES  Aggravating factors include: overuse    Therapies tried and outcome: ice, immobilization, support wrap and elevation    Not work related but he missed work on Wed and today  Normally works Wed thru Sunday        Problem list and histories reviewed & adjusted, as indicated.  Additional history: as documented    Labs reviewed in EPIC    Reviewed and updated as needed this visit by clinical staff  Tobacco  Allergies       Reviewed and updated as needed this visit by Provider         ROS:  CONSTITUTIONAL:NEGATIVE for fever, chills, change in weight  MUSCULOSKELETAL: POSITIVE  for arthralgias Lt knee and injury to Lt knee    OBJECTIVE:                                                    /80  Pulse 94  Temp 98.3  F (36.8  C) (Tympanic)  Resp 18  Ht 6' 3\" (1.905 m)  Wt 272 lb (123.4 kg)  SpO2 97%  BMI 34 kg/m2  Body mass index is 34 kg/(m^2).  GENERAL APPEARANCE: healthy, alert and no distress  MS: extremities normal- no gross deformities noted  ORTHO: Knee Exam: Inspection: AP/lateral alignment normal, small effusion  Tender: LCL  Non-tender: patella tendon, quadriceps insertion, lateral joint line, medial joint line  Active Range of Motion: full flexion, full extension, pain with extension  Strength: quad  5/5, Hamstrings  5/5, Gastroc  5/5 and Tibialis anterior  5/5  Special tests: normal Valgus stress test, normal Varus, negative Lachman's test, negative pivot shift, negative posterior drawer    Also examined: hip not tested     Diagnostic test results:  none  "     ASSESSMENT/PLAN:                                                        ICD-10-CM    1. Acute pain of left knee M25.562 methylPREDNISolone (MEDROL DOSEPAK) 4 MG tablet       Follow up with Provider - as needed if not improving   Patient Instructions   Ice packs to knee for 3-4 days, then alternate ice & heat.  Use Ice massage on trigger point areas.  Do gentle Range of motion exercises      Eze Strong MD  [facility]

## 2018-08-10 ENCOUNTER — TELEPHONE (OUTPATIENT)
Dept: FAMILY MEDICINE | Facility: CLINIC | Age: 58
End: 2018-08-10

## 2018-08-10 NOTE — TELEPHONE ENCOUNTER
Panel Management Review      Patient has the following on his problem list:       IVD      Last LDL:    Lab Results   Component Value Date    CHOL 216 11/09/2013     Lab Results   Component Value Date    HDL 42 11/09/2013     Lab Results   Component Value Date     09/22/2014     11/09/2013     Lab Results   Component Value Date    TRIG 124 11/09/2013        Lab Results   Component Value Date    CHOLHDLRATIO 5.1 11/09/2013        Is the patient on a Statin? NO   Is the patient on Aspirin? NO                    Last three blood pressure readings:  BP Readings from Last 3 Encounters:   05/31/18 136/80   04/20/18 150/90   10/09/17 138/90        Tobacco History:     History   Smoking Status     Former Smoker   Smokeless Tobacco     Never Used     Comment: quit 20 years ago       Hypertension   Last three blood pressure readings:  BP Readings from Last 3 Encounters:   05/31/18 136/80   04/20/18 150/90   10/09/17 138/90     Blood pressure: MONITOR    HTN Guidelines:  Age 18-59 BP range:  Less than 140/90  Age 60-85 with Diabetes:  Less than 140/90  Age 60-85 without Diabetes:  less than 150/90      Composite cancer screening  Chart review shows that this patient is due/due soon for the following Colonoscopy  Summary:    Patient is due/failing the following:   BP CHECK, COLONOSCOPY, FOLLOW UP, LDL and PHYSICAL    Action needed:   Patient needs office visit for yearly exam and med check. Patient needs referral/order: Colonoscopy    Type of outreach:    Sent letter.    Questions for provider review:    None                                                                                                                                    Princess MONICA Otoole CMA       Chart routed to none .

## 2018-08-10 NOTE — LETTER
August 10, 2018    Fabian Rod  [address] [place] [address]    Dear Nakita Peralta cares about your health and your health plan.  I have reviewed your medical conditions, medication list and lab results, and am making recommendations based on this review to better manage your health.    You are in particular need of attention regarding:  -Cholesterol  -High Blood Pressure  -Colon Cancer Screening  -Wellness (Physical) Visit     I am recommending that you:     -schedule a FOLLOWUP OFFICE APPOINTMENT with me.       -schedule a WELLNESS (Physical) APPOINTMENT with me.   I will check fasting labs the same day - nothing to eat except water and meds for 8-10 hours prior.    -schedule a COLONOSCOPY to look for colon cancer (due every 10 years or 5 years in higher risk situations.)        Colon cancer is now the second leading cause of cancer-related deaths in the [place] for both men and women and there are over 130,000 new cases and 50,000 deaths per year from colon cancer.  Colonoscopies can prevent 90-95% of these deaths.  Problem lesions can be removed before they ever become cancer.  This test is not only looking for cancer, but also getting rid of precancerious lesions.    If you are under/uninsured, we recommend you contact the [employer] program. [employer] is a free colorectal cancer screening program that provides colonoscopies for eligible under/uninsured [place] men and women. If you are interested in receiving a free colonoscopy, please call [employer] at [phone] (mention code [employer]) to see if you re eligible.      If you do not wish to do a colonoscopy or cannot afford to do one, at this time, there is another option. It is called a FIT test or Fecal Immunochemical Occult Blood Test (take home stool sample kit).  It does not replace the colonoscopy for colorectal cancer screening, but it can detect hidden bleeding in the lower colon.  It does need to be repeated every year  and if a positive result is obtained, you would be referred for a colonoscopy.          If you have completed either one of these tests at another facility, please call with the details of when and where the tests were done and if they were normal or not. Or have the records sent to our clinic so that we can best coordinate your care.      Please call us at the [facility] location:  [phone] or use [employer] to address the above recommendations.     Thank you for trusting [facility].  We appreciate the opportunity to serve you and look forward to supporting your healthcare in the future.    If you have (or plan to have) any of these tests done at a facility other than a [facility] or a [facility], please have the results sent to the [facility] location noted above.      Best Regards,    Eze Strong MD

## 2018-10-22 DIAGNOSIS — N52.8 OTHER MALE ERECTILE DYSFUNCTION: ICD-10-CM

## 2018-10-22 NOTE — TELEPHONE ENCOUNTER
"Requested Prescriptions   Pending Prescriptions Disp Refills     sildenafil (VIAGRA) 100 MG tablet [Pharmacy Med Name: SILDENAFIL TABS 100MG]  Last Written Prescription Date:  3/28/2018  Last Fill Quantity: 6,  # refills: 5   Last office visit: 5/31/2018 with prescribing provider:  Dr Strong   Future Office Visit:     6 tablet 5     Sig: TAKE 1 TABLET EVERY 3 DAYS    Erectile Dysfuction Protocol Passed    10/22/2018  9:54 AM       Passed - Absence of nitrates on medication list       Passed - Absence of Alpha Blockers on Med list       Passed - Recent (12 mo) or future (30 days) visit within the authorizing provider's specialty    Patient had office visit in the last 12 months or has a visit in the next 30 days with authorizing provider or within the authorizing provider's specialty.  See \"Patient Info\" tab in inbasket, or \"Choose Columns\" in Meds & Orders section of the refill encounter.             Passed - Patient is age 18 or older          "

## 2018-10-23 RX ORDER — SILDENAFIL 100 MG/1
TABLET, FILM COATED ORAL
Qty: 6 TABLET | Refills: 4 | Status: SHIPPED | OUTPATIENT
Start: 2018-10-23 | End: 2019-02-08

## 2019-02-08 DIAGNOSIS — N52.8 OTHER MALE ERECTILE DYSFUNCTION: ICD-10-CM

## 2019-02-09 NOTE — TELEPHONE ENCOUNTER
"Requested Prescriptions   Pending Prescriptions Disp Refills     sildenafil (VIAGRA) 100 MG tablet [Pharmacy Med Name: SILDENAFIL TABS 100MG]  Last Written Prescription Date:  10/23/2018  Last Fill Quantity: 6,  # refills: 4   Last office visit: 5/31/2018 with prescribing provider:  RUSTY   Future Office Visit:     6 tablet 4     Sig: TAKE 1 TABLET EVERY 3 DAYS    Erectile Dysfuction Protocol Passed - 2/8/2019  6:11 PM       Passed - Absence of nitrates on medication list       Passed - Absence of Alpha Blockers on Med list       Passed - Recent (12 mo) or future (30 days) visit within the authorizing provider's specialty    Patient had office visit in the last 12 months or has a visit in the next 30 days with authorizing provider or within the authorizing provider's specialty.  See \"Patient Info\" tab in inbasket, or \"Choose Columns\" in Meds & Orders section of the refill encounter.             Passed - Medication is active on med list       Passed - Patient is age 18 or older          "

## 2019-02-11 RX ORDER — SILDENAFIL 100 MG/1
TABLET, FILM COATED ORAL
Qty: 6 TABLET | Refills: 2 | Status: SHIPPED | OUTPATIENT
Start: 2019-02-11 | End: 2019-04-30

## 2019-03-01 ENCOUNTER — TELEPHONE (OUTPATIENT)
Dept: FAMILY MEDICINE | Facility: CLINIC | Age: 59
End: 2019-03-01

## 2019-03-01 NOTE — TELEPHONE ENCOUNTER
Patient Contact    Attempt # 1    Was call answered?  No.  Left message on voicemail with information to call back and schedule an appointment.

## 2019-03-01 NOTE — TELEPHONE ENCOUNTER
I do not see any previous telephone message about referral.  I don't see anything mentioned in visit notes.  He has not been seen since 5/31/18.  I do not have any idea why he wants referral

## 2019-03-01 NOTE — TELEPHONE ENCOUNTER
Reason for Call: Request for an order or referral:    Order or referral being requested: ENT     Date needed: as soon as possible    Has the patient been seen by the PCP for this problem? NO    Additional comments: Patient would like to get a referral to ENT specialist in [place]     Phone number Patient can be reached at:  Home number on file [phone] (home)    Best Time:  Anytime     Can we leave a detailed message on this number?  YES    Call taken on 3/1/2019 at 2:04 PM by Zayra Boles

## 2019-03-04 NOTE — TELEPHONE ENCOUNTER
Pt called reporting ongoing left ear pain and drainage. Aslo notes sinus pressure with postnasal drip. Advised he schedule an appointment for further evaluation and tx of symptoms. Pt agreed to schedule an appointment but declined an appointment before next weeks. States he has a busy work schedule. Writer stressed he schedule as soon as he can for possible infection.

## 2019-03-18 ENCOUNTER — OFFICE VISIT (OUTPATIENT)
Dept: FAMILY MEDICINE | Facility: CLINIC | Age: 59
End: 2019-03-18
Payer: COMMERCIAL

## 2019-03-18 VITALS
TEMPERATURE: 98.7 F | HEART RATE: 97 BPM | DIASTOLIC BLOOD PRESSURE: 68 MMHG | RESPIRATION RATE: 20 BRPM | OXYGEN SATURATION: 97 % | SYSTOLIC BLOOD PRESSURE: 98 MMHG | WEIGHT: 255 LBS | BODY MASS INDEX: 31.87 KG/M2

## 2019-03-18 DIAGNOSIS — R52 GENERALIZED BODY ACHES: Primary | ICD-10-CM

## 2019-03-18 DIAGNOSIS — H66.012 ACUTE SUPPURATIVE OTITIS MEDIA OF LEFT EAR WITH SPONTANEOUS RUPTURE OF TYMPANIC MEMBRANE, RECURRENCE NOT SPECIFIED: ICD-10-CM

## 2019-03-18 LAB
FLUAV+FLUBV AG SPEC QL: NEGATIVE
FLUAV+FLUBV AG SPEC QL: NEGATIVE
SPECIMEN SOURCE: NORMAL

## 2019-03-18 PROCEDURE — 87804 INFLUENZA ASSAY W/OPTIC: CPT | Performed by: FAMILY MEDICINE

## 2019-03-18 PROCEDURE — 99213 OFFICE O/P EST LOW 20 MIN: CPT | Performed by: FAMILY MEDICINE

## 2019-03-18 RX ORDER — CEFDINIR 300 MG/1
300 CAPSULE ORAL 2 TIMES DAILY
Qty: 20 CAPSULE | Refills: 0 | Status: SHIPPED | OUTPATIENT
Start: 2019-03-18 | End: 2019-04-30

## 2019-03-18 NOTE — LETTER
March 18, 2019      Fabian Rod  [address] [facility] [address]        To Whom It May Concern:    Fabian Rod  was seen on 3/18/19.  Please excuse him  until Wed 3/20/19 and off from 3/9 until then due to illness.        Sincerely,        Eze Strong MD

## 2019-03-18 NOTE — PROGRESS NOTES
SUBJECTIVE:   Fabian Rod is a 58 year old male who presents to clinic today for the following health issues:    Acute Illness   Acute illness concerns: URI  Onset: 9 days onset 3/9/19    Severity: severe    Accompanying signs and symptoms: dizziness    History (predisposing factors):  none      Fever: YES    Chills/Sweats: YES    Headache (location?): YES    Sinus Pressure:YES    Conjunctivitis:  YES- hurt to move    Ear Pain: YES: left, drainage from Lt ear    Rhinorrhea: YES    Congestion: YES    Sore Throat: no      Cough: YES-non-productive    Wheeze: no     Decreased Appetite: YES    Nausea: no     Vomiting: no     Diarrhea:  YES    Dysuria/Freq.: no     Fatigue/Achiness: YES    Sick/Strep Exposure: no        Precipitating or alleviating factors: None  Therapies tried and outcome:  acetaminophen and Theraflu    Still feels very weak  He has missed work all of last week and today              Problem list and histories reviewed & adjusted, as indicated.  Additional history: as documented    Labs reviewed in EPIC    Reviewed and updated as needed this visit by clinical staff  Tobacco  Allergies  Meds  Med Hx  Surg Hx  Fam Hx  Soc Hx      Reviewed and updated as needed this visit by Provider         ROS:  CONSTITUTIONAL:POSITIVE  for fatigue, fever , malaise and myalgias  ENT/MOUTH: POSITIVE for discharge from Lt ear, earache left, nasal congestion, postnasal drainage and sore throat  RESP:POSITIVE for cough-non productive  GI: POSITIVE for diarrhea and poor appetite and NEGATIVE for nausea and vomiting  MUSCULOSKELETAL: POSITIVE  for myalgia    OBJECTIVE:                                                    BP 98/68 (BP Location: Left arm, Patient Position: Sitting, Cuff Size: Adult Large)   Pulse 97   Temp 98.7  F (37.1  C) (Tympanic)   Resp 20   Wt 115.7 kg (255 lb)   SpO2 97%   BMI 31.87 kg/m    Body mass index is 31.87 kg/m .  GENERAL APPEARANCE: healthy, alert and no distress  HENT: nose  and mouth without ulcers or lesions, TM's pearly grey, normal light reflex right, TM fluid left, oral mucous membranes moist and oropharynx clear  NECK: no adenopathy, no asymmetry, masses, or scars, thyroid normal to palpation and no bruits  RESP: lungs clear to auscultation - no rales, rhonchi or wheezes  CV: regular rates and rhythm, normal S1 S2, no S3 or S4 and no murmur, click or rub  ABDOMEN: soft, nontender, without hepatosplenomegaly or masses and bowel sounds normal  MS: extremities normal- no gross deformities noted    Diagnostic test results:  Results for orders placed or performed in visit on 03/18/19 (from the past 24 hour(s))   Influenza A/B antigen   Result Value Ref Range    Influenza A/B Agn Specimen Nasal     Influenza A Negative NEG^Negative    Influenza B Negative NEG^Negative        ASSESSMENT/PLAN:                                                        ICD-10-CM    1. Generalized body aches R52 Influenza A/B antigen   2. Acute suppurative otitis media of left ear with spontaneous rupture of tympanic membrane, recurrence not specified H66.012 cefdinir (OMNICEF) 300 MG capsule       Follow up with Provider - 2 weeks if not resolved   Patient Instructions   Warm compress to Lt ear  Symptomatic treatment.  Will use saline gargles, tylenol and/or advil. Suck on  lozenges as needed. Push fluids. Salt water nasal spray as needed.  Eat small amounts and gradually increase       Eze Strong MD  [facility]

## 2019-03-18 NOTE — PATIENT INSTRUCTIONS
Warm compress to Lt ear  Symptomatic treatment.  Will use saline gargles, tylenol and/or advil. Suck on  lozenges as needed. Push fluids. Salt water nasal spray as needed.  Eat small amounts and gradually increase

## 2019-03-19 ENCOUNTER — TELEPHONE (OUTPATIENT)
Dept: FAMILY MEDICINE | Facility: CLINIC | Age: 59
End: 2019-03-19

## 2019-03-19 NOTE — TELEPHONE ENCOUNTER
Reason for Call:  Other note for work  Detailed comments: patient would like Dr Strong write a note for light duties work  Phone Number Patient can be reached at: Home number on file [phone] (home)  Best Time: any  Can we leave a detailed message on this number? YES  Call taken on 3/19/2019 at 9:25 AM by LOGAN ROSE

## 2019-03-19 NOTE — LETTER
March 21, 2019      Fabian Rod  [address] [place] [address]        To Whom It May Concern:    Fabian Rod was seen in our clinic. He may return to work with the following: limited to light duty - lifting no greater than 30 pounds and no bending/stooping on or about Tuesday 3/19/19, thru the end of this week      Sincerely,        Eze Strong MD

## 2019-03-21 NOTE — TELEPHONE ENCOUNTER
I will do note for Pt to have lighter work for this week.  He was seen on Monday 3/18/19.  Letter generated and given to Team 2 staff to notify Pt

## 2019-03-25 ENCOUNTER — OFFICE VISIT (OUTPATIENT)
Dept: FAMILY MEDICINE | Facility: CLINIC | Age: 59
End: 2019-03-25
Payer: COMMERCIAL

## 2019-03-25 VITALS
OXYGEN SATURATION: 98 % | HEART RATE: 95 BPM | WEIGHT: 253 LBS | RESPIRATION RATE: 20 BRPM | DIASTOLIC BLOOD PRESSURE: 84 MMHG | TEMPERATURE: 99.1 F | BODY MASS INDEX: 31.62 KG/M2 | SYSTOLIC BLOOD PRESSURE: 146 MMHG

## 2019-03-25 DIAGNOSIS — M1A.00X0 IDIOPATHIC CHRONIC GOUT WITHOUT TOPHUS, UNSPECIFIED SITE: ICD-10-CM

## 2019-03-25 DIAGNOSIS — H65.02 ACUTE SEROUS OTITIS MEDIA OF LEFT EAR, RECURRENCE NOT SPECIFIED: ICD-10-CM

## 2019-03-25 DIAGNOSIS — M25.522 LEFT ELBOW PAIN: Primary | ICD-10-CM

## 2019-03-25 DIAGNOSIS — M25.532 LEFT WRIST PAIN: ICD-10-CM

## 2019-03-25 LAB — ERYTHROCYTE [SEDIMENTATION RATE] IN BLOOD BY WESTERGREN METHOD: 79 MM/H (ref 0–20)

## 2019-03-25 PROCEDURE — 84550 ASSAY OF BLOOD/URIC ACID: CPT | Performed by: FAMILY MEDICINE

## 2019-03-25 PROCEDURE — 99214 OFFICE O/P EST MOD 30 MIN: CPT | Performed by: FAMILY MEDICINE

## 2019-03-25 PROCEDURE — 36415 COLL VENOUS BLD VENIPUNCTURE: CPT | Performed by: FAMILY MEDICINE

## 2019-03-25 PROCEDURE — 85652 RBC SED RATE AUTOMATED: CPT | Performed by: FAMILY MEDICINE

## 2019-03-25 PROCEDURE — 80048 BASIC METABOLIC PNL TOTAL CA: CPT | Performed by: FAMILY MEDICINE

## 2019-03-25 RX ORDER — PREDNISONE 10 MG/1
TABLET ORAL
Qty: 12 TABLET | Refills: 0 | Status: SHIPPED | OUTPATIENT
Start: 2019-03-25 | End: 2019-04-19

## 2019-03-25 NOTE — LETTER
March 25, 2019      Fabian Rod  [address] [facility] [place] [facility] [place] [address]        To Whom It May Concern:    Fabian Rod was seen in our clinic. He may return to work with the following: limited to light duty - lifting no greater than 20 pounds on or about Tuesday 3/26/19, for the rest of this week    Return to full duty without restrictions as of Monday 4/1/19      Sincerely,        Eze Strong MD

## 2019-03-25 NOTE — LETTER
March 27, 2019      Fabian Rod  [address] [place] [address]        Dear ,    We are writing to inform you of your test results.    Test results indicate you require additional follow up, see comment below.    Uric acid level high, up from last test a year ago.  Definite evidence for gout  Metabolic panel shows decreased kidney function  Sed rate is high showing inflammatory response    See me again in 2 weeks.  We need to recheck lab tests and follow up on how you are doing    Resulted Orders   Uric acid   Result Value Ref Range    Uric Acid 10.5 (H) 3.5 - 7.2 mg/dL   ESR: Erythrocyte sedimentation rate   Result Value Ref Range    Sed Rate 79 (H) 0 - 20 mm/h      Comment:      Results confirmed by repeat test   Basic metabolic panel  (Ca, Cl, CO2, Creat, Gluc, K, Na, BUN)   Result Value Ref Range    Sodium 135 133 - 144 mmol/L    Potassium 4.5 3.4 - 5.3 mmol/L    Chloride 109 94 - 109 mmol/L    Carbon Dioxide 17 (L) 20 - 32 mmol/L    Anion Gap 9 3 - 14 mmol/L    Glucose 103 (H) 70 - 99 mg/dL    Urea Nitrogen 90 (H) 7 - 30 mg/dL    Creatinine 2.74 (H) 0.66 - 1.25 mg/dL    GFR Estimate 24 (L) >60 mL/min/[1.73_m2]      Comment:      Non  GFR Calc  Starting 12/18/2018, serum creatinine based estimated GFR (eGFR) will be   calculated using the Chronic Kidney Disease Epidemiology Collaboration   (CKD-EPI) equation.      GFR Estimate If Black 28 (L) >60 mL/min/[1.73_m2]      Comment:       GFR Calc  Starting 12/18/2018, serum creatinine based estimated GFR (eGFR) will be   calculated using the Chronic Kidney Disease Epidemiology Collaboration   (CKD-EPI) equation.      Calcium 9.7 8.5 - 10.1 mg/dL       If you have any questions or concerns, please call the clinic at the number listed above.       Sincerely,        Eze Strong MD

## 2019-03-25 NOTE — PROGRESS NOTES
SUBJECTIVE:   Fabian Rod is a 58 year old male who presents to clinic today for the following health issues:    LT ear still feels plugged.  Denies pain.    Joint Pain    Onset: 4 days    Description:   Location: left elbow  Character: Dull ache    Intensity: severe    Progression of Symptoms: worse    Accompanying Signs & Symptoms:  Other symptoms: swelling    History:   Previous similar pain: no       Precipitating factors:   Trauma or overuse: no     Alleviating factors:  Improved by: ice    Therapies Tried and outcome: ice/heat, Advil    Pt was getting up to go to the bathroom and left arm gave out.     Pt has Hx of gout but has not flared in Lt arm before          Problem list and histories reviewed & adjusted, as indicated.  Additional history: as documented    Labs reviewed in EPIC    Reviewed and updated as needed this visit by clinical staff  Tobacco  Allergies  Meds  Med Hx  Surg Hx  Fam Hx  Soc Hx      Reviewed and updated as needed this visit by Provider         ROS:  CONSTITUTIONAL:NEGATIVE for fever, chills, change in weight  INTEGUMENTARY/SKIN: NEGATIVE for worrisome rashes, moles or lesions  ENT/MOUTH: POSITIVE for earache left, nasal congestion and postnasal drainage  RESP:NEGATIVE for significant cough or SOB  MUSCULOSKELETAL: POSITIVE  for arthralgias Lt elbow and Hx gout    OBJECTIVE:                                                    /84 (BP Location: Right arm, Patient Position: Sitting, Cuff Size: Adult Large)   Pulse 95   Temp 99.1  F (37.3  C) (Temporal)   Resp 20   Wt 114.8 kg (253 lb)   SpO2 98%   BMI 31.62 kg/m    Body mass index is 31.62 kg/m .  GENERAL APPEARANCE: healthy, alert and no distress  HENT: nose and mouth without ulcers or lesions and TM fluid left  NECK: no adenopathy, no asymmetry, masses, or scars and thyroid normal to palpation  RESP: lungs clear to auscultation - no rales, rhonchi or wheezes  MS: extremities normal- no gross deformities  noted  ORTHO: Elbow Exam: Inspection: swelling  Tender: lateral epicondyle and olecranon bursa  Non-tender: medial epicondyle and common flexor tendon  Range of Motion: painful  Strength: elbow strength full  Special tests: normal stability, normal valgus stress:       Diagnostic test results:  Lab: see below, results pending     ASSESSMENT/PLAN:                                                        ICD-10-CM    1. Left elbow pain M25.522 ESR: Erythrocyte sedimentation rate   2. Idiopathic chronic gout without tophus, unspecified site M1A.00X0 predniSONE (DELTASONE) 10 MG tablet     Uric acid     Basic metabolic panel  (Ca, Cl, CO2, Creat, Gluc, K, Na, BUN)   3. Left wrist pain M25.532 predniSONE (DELTASONE) 10 MG tablet   4. Acute serous otitis media of left ear, recurrence not specified H65.02         Follow up with Provider - 2 weeks if not resolved   Letter for light duty work this week then no restrictions next week  Patient Instructions   Ice packs to Lt elbow frequently      Eze Strong MD  [facility]

## 2019-03-26 LAB
ANION GAP SERPL CALCULATED.3IONS-SCNC: 9 MMOL/L (ref 3–14)
BUN SERPL-MCNC: 90 MG/DL (ref 7–30)
CALCIUM SERPL-MCNC: 9.7 MG/DL (ref 8.5–10.1)
CHLORIDE SERPL-SCNC: 109 MMOL/L (ref 94–109)
CO2 SERPL-SCNC: 17 MMOL/L (ref 20–32)
CREAT SERPL-MCNC: 2.74 MG/DL (ref 0.66–1.25)
GFR SERPL CREATININE-BSD FRML MDRD: 24 ML/MIN/{1.73_M2}
GLUCOSE SERPL-MCNC: 103 MG/DL (ref 70–99)
POTASSIUM SERPL-SCNC: 4.5 MMOL/L (ref 3.4–5.3)
SODIUM SERPL-SCNC: 135 MMOL/L (ref 133–144)
URATE SERPL-MCNC: 10.5 MG/DL (ref 3.5–7.2)

## 2019-04-19 ENCOUNTER — OFFICE VISIT (OUTPATIENT)
Dept: FAMILY MEDICINE | Facility: CLINIC | Age: 59
End: 2019-04-19
Payer: COMMERCIAL

## 2019-04-19 VITALS
SYSTOLIC BLOOD PRESSURE: 126 MMHG | OXYGEN SATURATION: 99 % | RESPIRATION RATE: 16 BRPM | HEIGHT: 75 IN | TEMPERATURE: 98.6 F | DIASTOLIC BLOOD PRESSURE: 78 MMHG | HEART RATE: 86 BPM | BODY MASS INDEX: 32.33 KG/M2 | WEIGHT: 260 LBS

## 2019-04-19 DIAGNOSIS — Z12.11 SCREEN FOR COLON CANCER: ICD-10-CM

## 2019-04-19 DIAGNOSIS — M25.532 LEFT WRIST PAIN: ICD-10-CM

## 2019-04-19 DIAGNOSIS — Z23 NEED FOR PROPHYLACTIC VACCINATION AND INOCULATION AGAINST INFLUENZA: ICD-10-CM

## 2019-04-19 DIAGNOSIS — M25.552 HIP PAIN, LEFT: Primary | ICD-10-CM

## 2019-04-19 DIAGNOSIS — M1A.00X0 IDIOPATHIC CHRONIC GOUT WITHOUT TOPHUS, UNSPECIFIED SITE: ICD-10-CM

## 2019-04-19 DIAGNOSIS — Z11.4 SCREENING FOR HIV (HUMAN IMMUNODEFICIENCY VIRUS): ICD-10-CM

## 2019-04-19 PROCEDURE — 99214 OFFICE O/P EST MOD 30 MIN: CPT | Performed by: FAMILY MEDICINE

## 2019-04-19 RX ORDER — PREDNISONE 10 MG/1
TABLET ORAL
Qty: 18 TABLET | Refills: 0 | Status: SHIPPED | OUTPATIENT
Start: 2019-04-19 | End: 2021-07-06

## 2019-04-19 ASSESSMENT — MIFFLIN-ST. JEOR: SCORE: 2074.98

## 2019-04-19 NOTE — LETTER
April 19, 2019      Fabian Rod  [address] [place] [address]        To Whom It May Concern:    Fabian Rod  was seen on Friday April 19, 2019.  Please excuse him  until Sat April 27, 2019 due to Lt hip pain.  He can return to work without restrictions after that.        Sincerely,        Eze Strong MD

## 2019-04-19 NOTE — PATIENT INSTRUCTIONS
Ice packs to hip for 3-4 days, then alternate ice & heat.  Use Ice massage on trigger point areas.  Do gentle Range of motion exercises

## 2019-04-19 NOTE — PROGRESS NOTES
"  SUBJECTIVE:   Fabian Rod is a 58 year old male who presents to clinic today for the following   health issues:    Musculoskeletal problem/pain      Duration: Ongoing since 2016 but worse since 04/13/2019    Description  Location: Left Hip    Intensity:  severe    Accompanying signs and symptoms: weakness of Left Leg    History  Previous similar problem: YES  Previous evaluation:  Yes    Precipitating or alleviating factors:  Trauma or overuse: YES  Aggravating factors include: standing    Therapies tried and outcome: ice, support wrap and rest/Some Relief    He had hip replacement done 2014  He has been worried if he would need to see Dr Ordoñez again     RESPIRATORY SYMPTOMS      Duration: 03/09/2019    Description  ear pain left    Severity: mild    Accompanying signs and symptoms: Ear Fullness    History (predisposing factors):  none    Precipitating or alleviating factors: None    Therapies tried and outcome:  Antibiotics/No Relief        Additional history: as documented    Reviewed  and updated as needed this visit by clinical staff  Tobacco  Allergies  Meds  Problems  Med Hx  Surg Hx  Fam Hx  Soc Hx          Reviewed and updated as needed this visit by Provider         Labs reviewed in EPIC    ROS:  CONSTITUTIONAL:NEGATIVE for fever, chills, change in weight  ENT/MOUTH: NEGATIVE for ear, mouth and throat problems and POSITIVE for postnasal drainage and rhinorrhea-clear  RESP:POSITIVE for cough-non productive  MUSCULOSKELETAL: POSITIVE  for arthralgias Lt hip    OBJECTIVE:                                                    /78   Pulse 86   Temp 98.6  F (37  C) (Tympanic)   Resp 16   Ht 1.905 m (6' 3\")   Wt 117.9 kg (260 lb)   SpO2 99%   BMI 32.50 kg/m    Body mass index is 32.5 kg/m .  GENERAL APPEARANCE: healthy, alert and no distress  HENT: ear canals and TM's normal, nose and mouth without ulcers or lesions, oral mucous membranes moist and oropharynx clear  RESP: lungs clear to " auscultation - no rales, rhonchi or wheezes  MS: extremities normal- no gross deformities noted  ORTHO: Hip Exam: Palpation: Tender:   left greater trochanter, left ASIS, left iliac crest  Non-tender:  left proximal hamstring attachment  Range of Motion:  Full ROM, both hips  Strength:  full strength  Special tests:  not done      Diagnostic test results:  none      ASSESSMENT/PLAN:                                                        ICD-10-CM    1. Hip pain, left M25.552    2. Screen for colon cancer Z12.11    3. Screening for HIV (human immunodeficiency virus) Z11.4    4. Need for prophylactic vaccination and inoculation against influenza Z23    5. Left wrist pain M25.532 predniSONE (DELTASONE) 10 MG tablet   6. Idiopathic chronic gout without tophus, unspecified site M1A.00X0 predniSONE (DELTASONE) 10 MG tablet       Follow up with Provider - 2 weeks if not improving   If not improving will likely need to go back to see ortho  Patient Instructions   Ice packs to hip for 3-4 days, then alternate ice & heat.  Use Ice massage on trigger point areas.  Do gentle Range of motion exercises      Eze Strong MD  [facility]

## 2019-04-19 NOTE — NURSING NOTE
"Chief Complaint   Patient presents with     Musculoskeletal Problem     Ear Problem     /78   Pulse 86   Temp 98.6  F (37  C) (Tympanic)   Resp 16   Ht 1.905 m (6' 3\")   Wt 117.9 kg (260 lb)   SpO2 99%   BMI 32.50 kg/m   Estimated body mass index is 32.5 kg/m  as calculated from the following:    Height as of this encounter: 1.905 m (6' 3\").    Weight as of this encounter: 117.9 kg (260 lb).  BP completed using cuff size: regular   Swapna Vergara CMA    Health Maintenance Due   Topic Date Due     PREVENTIVE CARE VISIT  1960     HIV SCREEN (SYSTEM ASSIGNED)  06/06/1976     COLON CANCER SCREEN (SYSTEM ASSIGNED)  06/06/2008     ZOSTER IMMUNIZATION (1 of 2) 06/06/2008     CBC Q1 YR  03/10/2015     ALT Q1 YR  04/10/2018     INFLUENZA VACCINE (1) 09/01/2018     Health Maintenance reviewed at today's visit patient asked to schedule/complete:   Routine Health Visit: Patient agrees to schedule  Colon Cancer:  Patient agrees to schedule  Immunizations:  Patient agrees to schedule    "

## 2019-05-14 DIAGNOSIS — I10 ESSENTIAL HYPERTENSION, BENIGN: ICD-10-CM

## 2019-05-14 DIAGNOSIS — M1A.00X0 IDIOPATHIC CHRONIC GOUT WITHOUT TOPHUS, UNSPECIFIED SITE: ICD-10-CM

## 2019-05-14 RX ORDER — LISINOPRIL AND HYDROCHLOROTHIAZIDE 12.5; 2 MG/1; MG/1
TABLET ORAL
Qty: 180 TABLET | Refills: 3 | Status: SHIPPED | OUTPATIENT
Start: 2019-05-14 | End: 2020-05-08

## 2019-05-14 RX ORDER — ALLOPURINOL 100 MG/1
TABLET ORAL
Qty: 90 TABLET | Refills: 3 | Status: SHIPPED | OUTPATIENT
Start: 2019-05-14 | End: 2020-05-08

## 2019-05-14 NOTE — TELEPHONE ENCOUNTER
Reason for Call:  Other call back    Detailed comments: Fabian called to say he's been informed he needs to come in for lab work before a medication refill. He says he has had recent lab work and should not need to come in.    Also, his ear is still draining.  Dr. Strong gave him medication for this, but it is not improved.    Fabian does not want to schedule an appointment, but would like to have a nurse call him.    Phone Number Patient can be reached at: Home number on file [phone] (home)    Best Time: any    Can we leave a detailed message on this number? YES    Call taken on 5/14/2019 at 8:19 AM by Amber Vázquez

## 2019-05-14 NOTE — TELEPHONE ENCOUNTER
Patient was called back. His left ear drainage has improved with prednisone for 1 week, but came back again. He now has yellow left ear discharge every morning. No pain. OTC ear wax removal didn't help. The ear seems stuffed up and he can't hear very well with it. Would patient benefit from an ear wash? Could he do a nurse only for it? Pt had this issue in 2017 and amoxicillin with ciprofloxacin otic solution helped until now (attached a note from from 9/5/17). Provider to review.     ASSESSMENT/PLAN:          ICD-10-CM     1. Acute suppurative otitis media of left ear without spontaneous rupture of tympanic membrane, recurrence not specified H66.002 amoxicillin (AMOXIL) 875 MG tablet   2. Other infective acute otitis externa of left ear H60.392 ciprofloxacin (CETRAXAL) 0.2 % otic solution   3. Flatulence, eructation and gas pain R14.3       R14.1         Pt also needs 2 refills next month. He is wondering why he needs more labs (does he need more labs?). BMP, uric acid, & ESR done 3/25/19.       lisinopril-hydrochlorothiazide (PRINZIDE/ZESTORETIC) 20-12.5 MG tablet  Last Written Prescription Date: 5/2/19  Last Fill Quantity: 60,  # refills: 0   Last office visit: 4/19/2019 with prescribing provider:  Dr. Strong    Future Office Visit:      allopurinol (ZYLOPRIM) 100 MG tablet  Last Written Prescription Date: 4/4/19  Last Fill Quantity: 30,  # refills: 0   Last office visit: 4/19/2019 with prescribing provider:  Dr. Strong    Future Office Visit:        Requested Prescriptions   Pending Prescriptions Disp Refills     lisinopril-hydrochlorothiazide (PRINZIDE/ZESTORETIC) 20-12.5 MG tablet 60 tablet 0       Diuretics (Including Combos) Protocol Failed - 5/14/2019 11:44 AM        Failed - Normal serum creatinine on file in past 12 months     Recent Labs   Lab Test 03/25/19  1532   CR 2.74*              Passed - Blood pressure under 140/90 in past 12 months     BP Readings from Last 3 Encounters:   04/19/19 126/78  "  03/25/19 146/84   03/18/19 98/68                 Passed - Recent (12 mo) or future (30 days) visit within the authorizing provider's specialty     Patient had office visit in the last 12 months or has a visit in the next 30 days with authorizing provider or within the authorizing provider's specialty.  See \"Patient Info\" tab in inbasket, or \"Choose Columns\" in Meds & Orders section of the refill encounter.              Passed - Medication is active on med list        Passed - Patient is age 18 or older        Passed - Normal serum potassium on file in past 12 months     Recent Labs   Lab Test 03/25/19  1532   POTASSIUM 4.5                    Passed - Normal serum sodium on file in past 12 months     Recent Labs   Lab Test 03/25/19  1532                 allopurinol (ZYLOPRIM) 100 MG tablet 30 tablet 0     Sig: TAKE 1 TABLET DAILY (ONE TIME ONLY, OVERDUE FOR A LAB DRAW, CALL AND MAKE A LAB ONLY APPOINTMENT)       Gout Agents Protocol Failed - 5/14/2019 11:44 AM        Failed - CBC on file in past 12 months     Recent Labs   Lab Test 03/22/14  0549 03/21/14  0559  03/10/14  1323   WBC  --   --   --  5.0   RBC  --   --   --  3.92*   HGB 8.6* 9.1*   < > 10.7*   HCT  --   --   --  33.1*   PLT  --  183   < > 257    < > = values in this interval not displayed.                 Failed - ALT on file in past 12 months     Recent Labs   Lab Test 04/10/17  1442   ALT 37             Failed - Has Uric Acid on file in past 12 months and value is less than 6     Recent Labs   Lab Test 03/25/19  1532   URIC 10.5*     If level is 6mg/dL or greater, ok to refill one time and refer to provider.           Failed - Normal serum creatinine on file in the past 12 months     Recent Labs   Lab Test 03/25/19  1532   CR 2.74*             Passed - Recent (12 mo) or future (30 days) visit within the authorizing provider's specialty     Patient had office visit in the last 12 months or has a visit in the next 30 days with authorizing " "provider or within the authorizing provider's specialty.  See \"Patient Info\" tab in inbasket, or \"Choose Columns\" in Meds & Orders section of the refill encounter.          Passed - Medication is active on med list        Passed - Patient is age 18 or older          "

## 2019-05-14 NOTE — TELEPHONE ENCOUNTER
"Requested Prescriptions   Pending Prescriptions Disp Refills     lisinopril-hydrochlorothiazide (PRINZIDE/ZESTORETIC) 20-12.5 MG tablet  Last Written Prescription Date:  5/2/2019  Last Fill Quantity: 60 tablet,  # refills: 0   Last office visit: 4/19/2019 with prescribing provider:  Ligia   Future Office Visit:     180 tablet 3     Sig: TAKE 2 TABLETS DAILY       Diuretics (Including Combos) Protocol Failed - 5/14/2019 11:58 AM        Failed - Normal serum creatinine on file in past 12 months     Recent Labs   Lab Test 03/25/19  1532   CR 2.74*              Passed - Blood pressure under 140/90 in past 12 months     BP Readings from Last 3 Encounters:   04/19/19 126/78   03/25/19 146/84   03/18/19 98/68                 Passed - Recent (12 mo) or future (30 days) visit within the authorizing provider's specialty     Patient had office visit in the last 12 months or has a visit in the next 30 days with authorizing provider or within the authorizing provider's specialty.  See \"Patient Info\" tab in inbasket, or \"Choose Columns\" in Meds & Orders section of the refill encounter.              Passed - Medication is active on med list        Passed - Patient is age 18 or older        Passed - Normal serum potassium on file in past 12 months     Recent Labs   Lab Test 03/25/19  1532   POTASSIUM 4.5                    Passed - Normal serum sodium on file in past 12 months     Recent Labs   Lab Test 03/25/19  1532                 allopurinol (ZYLOPRIM) 100 MG tablet  Last Written Prescription Date:  4/4/2019  Last Fill Quantity: 30 tablet,  # refills: 0   Last office visit: 4/19/2019 with prescribing provider:  Ligia   Future Office Visit:     90 tablet 3     Sig: TAKE 1 TABLET DAILY       Gout Agents Protocol Failed - 5/14/2019 11:58 AM        Failed - CBC on file in past 12 months     Recent Labs   Lab Test 03/22/14  0549 03/21/14  0559  03/10/14  1323   WBC  --   --   --  5.0   RBC  --   --   --  3.92*   HGB 8.6* 9.1*  " " < > 10.7*   HCT  --   --   --  33.1*   PLT  --  183   < > 257    < > = values in this interval not displayed.                 Failed - ALT on file in past 12 months     Recent Labs   Lab Test 04/10/17  1442   ALT 37             Failed - Has Uric Acid on file in past 12 months and value is less than 6     Recent Labs   Lab Test 03/25/19  1532   URIC 10.5*     If level is 6mg/dL or greater, ok to refill one time and refer to provider.           Failed - Normal serum creatinine on file in the past 12 months     Recent Labs   Lab Test 03/25/19  1532   CR 2.74*             Passed - Recent (12 mo) or future (30 days) visit within the authorizing provider's specialty     Patient had office visit in the last 12 months or has a visit in the next 30 days with authorizing provider or within the authorizing provider's specialty.  See \"Patient Info\" tab in inbasket, or \"Choose Columns\" in Meds & Orders section of the refill encounter.              Passed - Medication is active on med list        Passed - Patient is age 18 or older           "

## 2019-08-01 NOTE — PROGRESS NOTES
"Subjective     Fabian Rod is a 59 year old male who presents to clinic today for the following health issues:    HPI   Musculoskeletal problem/pain      Duration: Injured on Monday    Description  Location: Left ankle    Intensity:  7/10--with walking    Accompanying signs and symptoms: none    History  Previous similar problem: no   Previous evaluation:  none    Precipitating or alleviating factors:  Trauma or overuse: YES  Aggravating factors include: walking    Therapies tried and outcome: ice      Was pushing his car in the garage on Monday and accidentally twisted his left ankle.  Could not even walk the next day.  Having pain.    He is icing which is helping.    Pain has been improving significantly and is able to walk without any issues (wears an ankle brace).    Requesting to be on light-duty for 1 week due to his very physical job/work with [email or web address]    Has injured the same ankle last year--was put on prednisone which helped.      Reviewed and updated as needed this visit by Provider  Tobacco  Allergies  Meds  Problems  Med Hx  Surg Hx  Fam Hx         Review of Systems   ROS COMP: CONSTITUTIONAL: NEGATIVE for fever, chills, change in weight  INTEGUMENTARY/SKIN: NEGATIVE for worrisome rashes, moles or lesions  EYES: NEGATIVE for vision changes or irritation  ENT/MOUTH: NEGATIVE for ear, mouth and throat problems  RESP: NEGATIVE for significant cough or SOB  CV: NEGATIVE for chest pain, palpitations or peripheral edema  GI: NEGATIVE for nausea, abdominal pain, heartburn, or change in bowel habits  : NEGATIVE for frequency, dysuria, or hematuria  NEURO: NEGATIVE for weakness, dizziness or paresthesias  HEME: NEGATIVE for bleeding problems      Objective    /80 (BP Location: Left arm, Patient Position: Sitting)   Pulse 86   Temp 98.8  F (37.1  C) (Tympanic)   Resp 14   Ht 1.905 m (6' 3\")   Wt 124.5 kg (274 lb 8 oz)   SpO2 99%   BMI 34.31 kg/m    Body mass index is 34.31 kg/m .  Physical " Exam   GENERAL: Alert and no distress  EYES: Eyes grossly normal to inspection, PERRL and conjunctivae and sclerae normal  HENT: ear canals and TM's normal, nose and mouth without ulcers or lesions  NECK: no adenopathy, no asymmetry, masses, or scars and thyroid normal to palpation  RESP: lungs clear to auscultation - no rales, rhonchi or wheezes  CV: regular rate and rhythm, normal S1 S2, no S3 or S4, no murmur, click or rub, no peripheral edema and peripheral pulses strong  ABDOMEN: soft, nontender, and bowel sounds normal  MS: left ankle that is   TTP at the lateral malleolus.  No tenderness at the medial malleolus.  Tenderness with left ankle eversion.    Walks with a slight limp, favoring the uninjured right lower extremity.  SKIN: +fungal infection on toenails.  NEURO: Normal strength and tone, sensory exam grossly normal and mentation intact  PSYCH: mentation appears normal, affect normal/bright    Diagnostic Test Results:  Labs reviewed in Epic        Assessment & Plan   Problem List Items Addressed This Visit     None      Visit Diagnoses     Sprain of anterior talofibular ligament of left ankle, initial encounter    -  Primary    Relevant Medications    methylPREDNISolone (MEDROL DOSEPAK) 4 MG tablet therapy pack           Recommended RICE therapy and to avoid weight bearing activities for the next 1-2 weeks with plans to gradually increase to her normal physical activities as tolerated.  Wear brace daily for next 2 weeks, may take off at night and when doing light stretches.  I encouraged him to schedule high dose Ibuprofen (take with food) for 1-2 weeks and alternate with Tylenol.   Pt requesting medrol dose pack because it has worked in the past for him for a previous ankle injury--may use if ibuprofen does not help.  Sprain may take 4-6 weeks to heal/resolve  Work restrictions provided as requested.      BMI:   Estimated body mass index is 34.31 kg/m  as calculated from the following:    Height as of  "this encounter: 1.905 m (6' 3\").    Weight as of this encounter: 124.5 kg (274 lb 8 oz).   Weight management plan: Discussed healthy diet and exercise guidelines        See Patient Instructions  Return in about 2 weeks (around 8/16/2019) for re-check / follow-up.    Adelina Velasquez, [facility]      "

## 2019-08-02 ENCOUNTER — OFFICE VISIT (OUTPATIENT)
Dept: FAMILY MEDICINE | Facility: CLINIC | Age: 59
End: 2019-08-02
Payer: COMMERCIAL

## 2019-08-02 VITALS
WEIGHT: 274.5 LBS | OXYGEN SATURATION: 99 % | SYSTOLIC BLOOD PRESSURE: 138 MMHG | HEART RATE: 86 BPM | BODY MASS INDEX: 34.13 KG/M2 | RESPIRATION RATE: 14 BRPM | HEIGHT: 75 IN | TEMPERATURE: 98.8 F | DIASTOLIC BLOOD PRESSURE: 80 MMHG

## 2019-08-02 DIAGNOSIS — S93.492A SPRAIN OF ANTERIOR TALOFIBULAR LIGAMENT OF LEFT ANKLE, INITIAL ENCOUNTER: Primary | ICD-10-CM

## 2019-08-02 PROCEDURE — 99213 OFFICE O/P EST LOW 20 MIN: CPT | Performed by: FAMILY MEDICINE

## 2019-08-02 RX ORDER — METHYLPREDNISOLONE 4 MG
TABLET, DOSE PACK ORAL
Qty: 21 TABLET | Refills: 0 | Status: SHIPPED | OUTPATIENT
Start: 2019-08-02 | End: 2020-02-21

## 2019-08-02 ASSESSMENT — MIFFLIN-ST. JEOR: SCORE: 2135.75

## 2019-08-02 NOTE — LETTER
August 2, 2019      Fabian Rod  [address] [place] [address]        To Whom It May Concern:    Fabian Rod was seen in our clinic today.   He may return to work with the following restrictions until 8/9/19:    --Needs to be on light-duty     May return to full-duty (without restrictions) on 8/9/19.      Sincerely,        Adelina Velasquez, DO

## 2019-08-02 NOTE — PATIENT INSTRUCTIONS
Patient Education     Treating Ankle Sprains  Treatment will depend on how bad your sprain is. For a severe sprain, healing may take 3 months or more.  Right after your injury: Use R.I.C.E.    BIG: Rest: At first, keep weight off the ankle as much as you can. You may be given crutches to help you walk without putting weight on the ankle.    BIG: Ice: Put an ice pack on the ankle for 20 minutes. Remove the pack and wait at least 30 minutes. Repeat for up to 3 days. This helps reduce swelling.    BIG: Compression: To reduce swelling and keep the joint stable, you may need to wrap the ankle with an elastic bandage. For more severe sprains, you may need an ankle brace, a boot, or a cast.    BIG: Elevation: To reduce swelling, keep your ankle raised above your heart when you sit or lie down.  Medicine  Your healthcare provider may suggest oral nonsteroidal anti-inflammatory medicine (NSAIDs), such as ibuprofen. This relieves the pain and helps reduce swelling. Be sure to take your medicine as directed.  Exercises    After about 2 to 3 weeks, you may be given exercises to strengthen the ligaments and muscles in the ankle. Doing these exercises will help prevent another ankle sprain. Exercises may include standing on your toes and then on your heels and doing ankle curls.    Sit on the edge of a sturdy table or lie on your back.    Pull your toes toward you. Then point them away from you. Repeat for 2 to 3 minutes.  Date Last Reviewed: 1/1/2018 2000-2018 [employer]. [address] [place] [address], [place] [address]. All rights reserved. This information is not intended as a substitute for professional medical care. Always follow your healthcare professional's instructions.

## 2019-08-26 ENCOUNTER — TELEPHONE (OUTPATIENT)
Dept: FAMILY MEDICINE | Facility: CLINIC | Age: 59
End: 2019-08-26

## 2019-08-26 DIAGNOSIS — K21.9 GASTROESOPHAGEAL REFLUX DISEASE WITHOUT ESOPHAGITIS: ICD-10-CM

## 2019-08-26 RX ORDER — SUCRALFATE 1 G/1
1 TABLET ORAL 4 TIMES DAILY
Qty: 120 TABLET | Refills: 1 | Status: SHIPPED | OUTPATIENT
Start: 2019-08-26 | End: 2019-10-31

## 2019-08-26 NOTE — TELEPHONE ENCOUNTER
Reason for Call:  Other call back    Detailed comments: Patient wants to discuss getting a different medication for GERD. Please call to discuss    Phone Number Patient can be reached at: Home number on file [phone] (home)    Best Time: any    Can we leave a detailed message on this number? YES    Call taken on 8/26/2019 at 9:07 AM by TEVIN FLAHERTY

## 2019-08-26 NOTE — TELEPHONE ENCOUNTER
Patient says the OTC medication he has been taking for his GERD is not working very well. States that he used to take omeprazole, but it did not help relieve his symptoms enough.   Would like Rx sent to [facility].     Interested in taking:  Sucralfate   Metoclopramide    Routing to provider to review

## 2019-08-26 NOTE — TELEPHONE ENCOUNTER
Should not need both of those medications.  I will do Rx for Sulcrafate but he should schedule appt to see me in next 2-3 weeks

## 2019-10-18 DIAGNOSIS — N52.8 OTHER MALE ERECTILE DYSFUNCTION: ICD-10-CM

## 2019-10-18 NOTE — TELEPHONE ENCOUNTER
"Requested Prescriptions   Pending Prescriptions Disp Refills     sildenafil (VIAGRA) 100 MG tablet [Pharmacy Med Name: SILDENAFIL TABS 100MG]  Last Written Prescription Date:  7/8/2019  Last Fill Quantity: 6 tablet,  # refills: 3   Last office visit: 8/2/2019 with prescribing provider:  Ron   Future Office Visit:     6 tablet 24     Sig: TAKE 1 TABLET EVERY 3 DAYS       Erectile Dysfuction Protocol Passed - 10/18/2019 10:48 AM        Passed - Absence of nitrates on medication list        Passed - Absence of Alpha Blockers on Med list        Passed - Recent (12 mo) or future (30 days) visit within the authorizing provider's specialty     Patient has had an office visit with the authorizing provider or a provider within the authorizing providers department within the previous 12 mos or has a future within next 30 days. See \"Patient Info\" tab in inbasket, or \"Choose Columns\" in Meds & Orders section of the refill encounter.              Passed - Medication is active on med list        Passed - Patient is age 18 or older           "

## 2019-10-21 RX ORDER — SILDENAFIL 100 MG/1
TABLET, FILM COATED ORAL
Qty: 6 TABLET | Refills: 3 | Status: SHIPPED | OUTPATIENT
Start: 2019-10-21 | End: 2020-03-09

## 2019-10-21 NOTE — TELEPHONE ENCOUNTER
Prescription approved per [facility] Refill Protocol.  Angelia Gore RN- Triage [employer]

## 2019-10-26 ENCOUNTER — OFFICE VISIT (OUTPATIENT)
Dept: FAMILY MEDICINE | Facility: CLINIC | Age: 59
End: 2019-10-26
Payer: COMMERCIAL

## 2019-10-26 ENCOUNTER — ANCILLARY PROCEDURE (OUTPATIENT)
Dept: GENERAL RADIOLOGY | Facility: CLINIC | Age: 59
End: 2019-10-26
Attending: FAMILY MEDICINE
Payer: COMMERCIAL

## 2019-10-26 VITALS
OXYGEN SATURATION: 99 % | TEMPERATURE: 97.4 F | SYSTOLIC BLOOD PRESSURE: 130 MMHG | HEIGHT: 75 IN | RESPIRATION RATE: 18 BRPM | DIASTOLIC BLOOD PRESSURE: 78 MMHG | BODY MASS INDEX: 33.94 KG/M2 | WEIGHT: 273 LBS | HEART RATE: 88 BPM

## 2019-10-26 DIAGNOSIS — M25.571 PAIN IN JOINT INVOLVING ANKLE AND FOOT, RIGHT: Primary | ICD-10-CM

## 2019-10-26 DIAGNOSIS — M1A.00X0 IDIOPATHIC CHRONIC GOUT WITHOUT TOPHUS, UNSPECIFIED SITE: ICD-10-CM

## 2019-10-26 DIAGNOSIS — M25.571 PAIN IN JOINT INVOLVING ANKLE AND FOOT, RIGHT: ICD-10-CM

## 2019-10-26 DIAGNOSIS — I10 ESSENTIAL HYPERTENSION, BENIGN: Chronic | ICD-10-CM

## 2019-10-26 DIAGNOSIS — N18.4 CKD (CHRONIC KIDNEY DISEASE) STAGE 4, GFR 15-29 ML/MIN (H): ICD-10-CM

## 2019-10-26 LAB
ANION GAP SERPL CALCULATED.3IONS-SCNC: 6 MMOL/L (ref 3–14)
BUN SERPL-MCNC: 28 MG/DL (ref 7–30)
CALCIUM SERPL-MCNC: 8.9 MG/DL (ref 8.5–10.1)
CHLORIDE SERPL-SCNC: 107 MMOL/L (ref 94–109)
CHOLEST SERPL-MCNC: 241 MG/DL
CO2 SERPL-SCNC: 22 MMOL/L (ref 20–32)
CREAT SERPL-MCNC: 1.59 MG/DL (ref 0.66–1.25)
ERYTHROCYTE [SEDIMENTATION RATE] IN BLOOD BY WESTERGREN METHOD: 23 MM/H (ref 0–20)
GFR SERPL CREATININE-BSD FRML MDRD: 46 ML/MIN/{1.73_M2}
GLUCOSE SERPL-MCNC: 103 MG/DL (ref 70–99)
HDLC SERPL-MCNC: 42 MG/DL
LDLC SERPL CALC-MCNC: 158 MG/DL
NONHDLC SERPL-MCNC: 199 MG/DL
POTASSIUM SERPL-SCNC: 4.2 MMOL/L (ref 3.4–5.3)
SODIUM SERPL-SCNC: 135 MMOL/L (ref 133–144)
TRIGL SERPL-MCNC: 203 MG/DL
URATE SERPL-MCNC: 7.5 MG/DL (ref 3.5–7.2)

## 2019-10-26 PROCEDURE — 73610 X-RAY EXAM OF ANKLE: CPT | Mod: RT

## 2019-10-26 PROCEDURE — 36415 COLL VENOUS BLD VENIPUNCTURE: CPT | Performed by: FAMILY MEDICINE

## 2019-10-26 PROCEDURE — 85652 RBC SED RATE AUTOMATED: CPT | Performed by: FAMILY MEDICINE

## 2019-10-26 PROCEDURE — 80061 LIPID PANEL: CPT | Performed by: FAMILY MEDICINE

## 2019-10-26 PROCEDURE — 84550 ASSAY OF BLOOD/URIC ACID: CPT | Performed by: FAMILY MEDICINE

## 2019-10-26 PROCEDURE — 99214 OFFICE O/P EST MOD 30 MIN: CPT | Performed by: FAMILY MEDICINE

## 2019-10-26 PROCEDURE — 80048 BASIC METABOLIC PNL TOTAL CA: CPT | Performed by: FAMILY MEDICINE

## 2019-10-26 ASSESSMENT — MIFFLIN-ST. JEOR: SCORE: 2128.95

## 2019-10-26 NOTE — LETTER
October 26, 2019      Fabian Rod  [address] [facility] [address]        To Whom It May Concern:    Fabian Rod was seen in our clinic. He may return to work with the following: limited to light duty - lifting no greater than 30 pounds on or about Wed Oct 30.  Light duty for 1 week, then work without restrictions after that  Off work from Thurs Oct 24-Sat Oct 26      Sincerely,        Eze Strong MD

## 2019-10-26 NOTE — PROGRESS NOTES
Subjective     Fabian Rod is a 59 year old male who presents to clinic today for the following health issues:    HPI     Letter for Work    Musculoskeletal problem/pain      Duration: 10/23/2019    Description  Location: Right Ankle    Intensity:  severe    Accompanying signs and symptoms: swelling     History  Previous similar problem: no   Previous evaluation:  none    Precipitating or alleviating factors:  Trauma or overuse: YES  Aggravating factors include: Putting weight on Right Foot    Therapies tried and outcome: R.I.C.E.      Pt was out in his yard raking. Misstepped, fell backwards but caught himself  Pain in Rt ankle outer aspect    He missed work on Thursday thru today    Musculoskeletal problem/pain      Duration: intermittent    Description  Location: ankles, feet    Intensity:  moderate    Accompanying signs and symptoms: swelling    History  Previous similar problem: YES  Previous evaluation:  x-ray    Precipitating or alleviating factors:  Trauma or overuse: YES  Aggravating factors include: standing and walking    Therapies tried and outcome: rest/inactivity and ice    Hypertension Follow-up      Do you check your blood pressure regularly outside of the clinic? No     Are you following a low salt diet? Yes    Are your blood pressures ever more than 140 on the top number (systolic) OR more   than 90 on the bottom number (diastolic), for example 140/90? No    BP Readings from Last 3 Encounters:   10/26/19 130/78   08/02/19 138/80   04/19/19 126/78    Wt Readings from Last 3 Encounters:   10/26/19 123.8 kg (273 lb)   08/02/19 124.5 kg (274 lb 8 oz)   04/19/19 117.9 kg (260 lb)                      Reviewed and updated as needed this visit by Provider         Review of Systems   ROS COMP: CONSTITUTIONAL: NEGATIVE for fever, chills, change in weight  ENT/MOUTH: NEGATIVE for ear, mouth and throat problems  RESP: NEGATIVE for significant cough or SOB  CV: NEGATIVE for chest pain, palpitations or  "peripheral edema  MUSCULOSKELETAL: POSITIVE  for arthralgias ankles, Hx gout and injury to Rt ankle      Objective    /78   Pulse 88   Temp 97.4  F (36.3  C) (Tympanic)   Resp 18   Ht 1.905 m (6' 3\")   Wt 123.8 kg (273 lb)   SpO2 99%   BMI 34.12 kg/m    Body mass index is 34.12 kg/m .  Physical Exam   GENERAL: healthy, alert and no distress  NECK: no adenopathy, no asymmetry, masses, or scars and thyroid normal to palpation  RESP: lungs clear to auscultation - no rales, rhonchi or wheezes  CV: regular rate and rhythm, normal S1 S2, no S3 or S4, no murmur, click or rub, no peripheral edema and peripheral pulses strong  ABDOMEN: soft, nontender, no hepatosplenomegaly, no masses and bowel sounds normal  MS: RLE exam shows moderate swelling, tender to palpation lateral Rt ankle.  No instability    Diagnostic Test Results:  Labs reviewed in [facility]  Lab: see below, results pending  Xray - Rt ankle: no Fx seen, moderate arthritis changes. I viewed xray myself, radiology report pending           Assessment & Plan     Fabian was seen today for letter for school/work and musculoskeletal problem.    Diagnoses and all orders for this visit:    Pain in joint involving ankle and foot, right  -     XR Ankle Right G/E 3 Views; Future    Idiopathic chronic gout without tophus, unspecified site  -     Uric acid  -     ESR: Erythrocyte sedimentation rate    Essential hypertension, benign  -     Lipid panel reflex to direct LDL Fasting  -     Basic metabolic panel    CKD (chronic kidney disease) stage 4, GFR 15-29 ml/min (H)  -     Basic metabolic panel         BMI:   Estimated body mass index is 34.12 kg/m  as calculated from the following:    Height as of this encounter: 1.905 m (6' 3\").    Weight as of this encounter: 123.8 kg (273 lb).   Weight management plan: Discussed healthy diet and exercise guidelines        FUTURE APPOINTMENTS:       - Follow-up visit in 3 weeks if not improving  Patient Instructions   Ice pack, " elevate for 3 days.  Use ACE wrap compression, then  Alternate ice & heat. Do gentle Range of motion exercises      Return in about 3 weeks (around 11/16/2019) for ankle pain, Hypertension.    Eze Strong MD  [facility]

## 2019-10-26 NOTE — LETTER
October 28, 2019      Fabian Rod  [address] [place] [address]        Dear ,    We are writing to inform you of your test results.    Test results indicate you  require additional follow up, see comment below.    Cholesterol panel shows elevated total, Triglycerides and LDL levels.  Needs to be addressed  Metabolic panel shows mild decrease in kidney function but improved from last test  Uric acid level is high, confirms gout  Sed rate show minimal elevation    Schedule a follow up appointment in about 3 weeks    Resulted Orders   Lipid panel reflex to direct LDL Fasting   Result Value Ref Range    Cholesterol 241 (H) <200 mg/dL      Comment:      Desirable:       <200 mg/dl    Triglycerides 203 (H) <150 mg/dL      Comment:      Borderline high:  150-199 mg/dl  High:             200-499 mg/dl  Very high:       >499 mg/dl  Fasting specimen      HDL Cholesterol 42 >39 mg/dL    LDL Cholesterol Calculated 158 (H) <100 mg/dL      Comment:      Above desirable:  100-129 mg/dl  Borderline High:  130-159 mg/dL  High:             160-189 mg/dL  Very high:       >189 mg/dl      Non HDL Cholesterol 199 (H) <130 mg/dL      Comment:      Above Desirable:  130-159 mg/dl  Borderline high:  160-189 mg/dl  High:             190-219 mg/dl  Very high:       >219 mg/dl     Basic metabolic panel   Result Value Ref Range    Sodium 135 133 - 144 mmol/L    Potassium 4.2 3.4 - 5.3 mmol/L    Chloride 107 94 - 109 mmol/L    Carbon Dioxide 22 20 - 32 mmol/L    Anion Gap 6 3 - 14 mmol/L    Glucose 103 (H) 70 - 99 mg/dL      Comment:      Fasting specimen    Urea Nitrogen 28 7 - 30 mg/dL    Creatinine 1.59 (H) 0.66 - 1.25 mg/dL    GFR Estimate 46 (L) >60 mL/min/[1.73_m2]      Comment:      Non  GFR Calc  Starting 12/18/2018, serum creatinine based estimated GFR (eGFR) will be   calculated using the Chronic Kidney Disease Epidemiology Collaboration   (CKD-EPI) equation.      GFR Estimate If Black 53 (L) >60  mL/min/[1.73_m2]      Comment:       GFR Calc  Starting 12/18/2018, serum creatinine based estimated GFR (eGFR) will be   calculated using the Chronic Kidney Disease Epidemiology Collaboration   (CKD-EPI) equation.      Calcium 8.9 8.5 - 10.1 mg/dL   Uric acid   Result Value Ref Range    Uric Acid 7.5 (H) 3.5 - 7.2 mg/dL   ESR: Erythrocyte sedimentation rate   Result Value Ref Range    Sed Rate 23 (H) 0 - 20 mm/h       If you have any questions or concerns, please call the clinic at the number listed above.       Sincerely,        Eze Strong MD

## 2019-10-26 NOTE — PATIENT INSTRUCTIONS
Ice pack, elevate for 3 days.  Use ACE wrap compression, then  Alternate ice & heat. Do gentle Range of motion exercises

## 2019-10-26 NOTE — NURSING NOTE
"Chief Complaint   Patient presents with     Letter for School/Work     Musculoskeletal Problem     /78   Pulse 88   Temp 97.4  F (36.3  C) (Tympanic)   Resp 18   Ht 1.905 m (6' 3\")   Wt 123.8 kg (273 lb)   SpO2 99%   BMI 34.12 kg/m   Estimated body mass index is 34.12 kg/m  as calculated from the following:    Height as of this encounter: 1.905 m (6' 3\").    Weight as of this encounter: 123.8 kg (273 lb).  BP completed using cuff size: regular   Swapna Vergara CMA    Health Maintenance Due   Topic Date Due     PREVENTIVE CARE VISIT  1960     COLONOSCOPY  06/06/1968     HIV SCREENING  06/06/1973     ZOSTER IMMUNIZATION (1 of 2) 06/06/2008     LIPID  11/09/2014     CBC  03/10/2015     ALT  04/10/2018     INFLUENZA VACCINE (1) 09/01/2019     Health Maintenance reviewed at today's visit patient asked to schedule/complete:   Routine Health Visit: Patient agrees to schedule  Colon Cancer:  Patient agrees to schedule  Immunizations:  Patient agrees to schedule    "

## 2019-10-30 DIAGNOSIS — K21.9 GASTROESOPHAGEAL REFLUX DISEASE WITHOUT ESOPHAGITIS: ICD-10-CM

## 2019-10-30 NOTE — TELEPHONE ENCOUNTER
"Requested Prescriptions   Pending Prescriptions Disp Refills     sucralfate (CARAFATE) 1 GM tablet    Last Written Prescription Date:  08/26/2019  Last Fill Quantity: 120 tablet,  # refills: 1   Last office visit: 10/26/2019 with prescribing provider:  Ligia   Future Office Visit:     120 tablet 1     Sig: Take 1 tablet (1 g) by mouth 4 times daily       Miscellaneous Gastrointestinal Agents Passed - 10/30/2019  8:23 AM        Passed - Recent (12 mo) or future (30 days) visit within the authorizing provider's specialty     Patient has had an office visit with the authorizing provider or a provider within the authorizing providers department within the previous 12 mos or has a future within next 30 days. See \"Patient Info\" tab in inbasket, or \"Choose Columns\" in Meds & Orders section of the refill encounter.              Passed - Medication is active on med list        Passed - Patient is 18 years of age or older           "

## 2019-10-30 NOTE — TELEPHONE ENCOUNTER
Reason for Call:  Medication or medication refill:    Do you use a [facility]?  Name of the pharmacy and phone number for the current request[facility]    Name of the medication requested: sucralfate (CARAFATE) 1 GM tablet    Other request:     Can we leave a detailed message on this number? YES    Phone number patient can be reached at: Home number on file [phone] (home)    Best Time:     Call taken on 10/30/2019 at 8:22 AM by RENE GONZALES

## 2019-10-31 RX ORDER — SUCRALFATE 1 G/1
1 TABLET ORAL 4 TIMES DAILY
Qty: 360 TABLET | Refills: 3 | Status: SHIPPED | OUTPATIENT
Start: 2019-10-31 | End: 2020-10-08

## 2019-12-11 ENCOUNTER — NURSE TRIAGE (OUTPATIENT)
Dept: FAMILY MEDICINE | Facility: CLINIC | Age: 59
End: 2019-12-11

## 2019-12-11 NOTE — TELEPHONE ENCOUNTER
"Routing to provider.    Pt calling and stating GERD is flaring up. States he was put on medication called \"Sucralfate\". States that after about 2 months on medication it is no longer working. States that he has been having increased gas and burping. States that per his friends, his burping smells like feces.    States he has been having regular BM. Denies N/V. He literally does not have any other problems that he is concerned about, states this feels like his exact normal GERD, but would be OK to come in for OV if needed.     States he wants to try a new medication, Metoclopramide. Thinks this may work.       Reason for Disposition    Prescription request for new medication (not a refill)    Additional Information    Negative: [1] Life-threatening allergic reaction suspected AND [2] from a medication (Serious symptoms include breathing problems, swallowing problems, too weak to stand, and fainting)    Negative: Drug overdose    Negative: Rash began while taking amoxicillin OR augmentin    Negative: [1] Widespread rash AND [2] taking prescription medicine    Negative: [1] Widespread rash AND [2] taking non-prescription (OTC) medicine    Negative: Asthma medicine questions and having asthma symptoms    Negative: Reflux medicine questions for a fussy baby    Negative: Pain medicine questions for a post-op child    Negative: Vomiting or nausea due to medication OR medication re-dosing questions after vomiting medicine    Negative: Diarrhea began after starting antibiotic    Negative: Child refuses to take a medicine OR child uncooperative when trying to give medication    Negative: Medication administration techniques, questions about    Calls about a prescription    Negative: Diabetes medication overdose (e.g., insulin)    Negative: Drug overdose and nurse unable to answer question    Negative: [1] Breastfeeding AND [2] question about maternal medicines    Negative: Medication refusal OR child uncooperative when trying " to give medication    Negative: Medication administration techniques, questions about    Negative: Vomiting or nausea due to medication OR medication re-dosing questions after vomiting medicine    Negative: Diarrhea from taking antibiotic    Negative: Caller requesting a prescription for Strep throat and has a positive culture result    Negative: Rash while taking a prescription medication or within 3 days of stopping it    Negative: Immunization reaction suspected    Negative: Asthma rescue med (e.g., albuterol) or devices request    Negative: [1] Asthma AND [2] having symptoms of asthma (cough, wheezing, etc)    Negative: [1] Croup symptoms AND [2] requests oral steroid OR has steroid and wants to start it    Negative: [1] Influenza symptoms AND [2] anti-viral med (such as Tamiflu) prescription request    Negative: [1] Eczema flare-up AND [2] steroid ointment refill request    Negative: [1] Symptom of illness (e.g., headache, abdominal pain, earache, vomiting) AND [2] more than mild    Negative: Reflux med questions and child fussy    Negative: Post-op pain or meds, questions about    Negative: Birth control pills, questions about    Negative: Caller requesting information not related to medication    Negative: [1] Prescription not at pharmacy AND [2] was prescribed by PCP recently (Exception: RN has access to EMR and prescription is recorded there. Go to Home Care and confirm for pharmacy.)    Negative: [1] Prescription refill request for essential med (harm to patient if med not taken) AND [2] triager unable to fill per unit policy    Negative: Pharmacy calling with prescription question and triager unable to answer question    Negative: [1] Caller has urgent question about med that PCP or specialist prescribed AND [2] triager unable to answer question    Negative: [1] Prescription request for spilled medication (e.g., antibiotic) AND [2] triager unable to fill per unit policy (Exception: 3 or less days  "remaining in 10 day course)    Negative: [1] Caller has medication question about med not prescribed by PCP AND [2] triager unable to answer question (e.g. compatibility with other med, storage)    Answer Assessment - Initial Assessment Questions  1.  NAME of MEDICATION: \"What medicine are you calling about?\"      Surcafate- wants Metoclopramide  2.  QUESTION: \"What is your question?\"      Wants to know if he can switch to new medication because his GERD is not reacting to new medication  3.  PRESCRIBING HCP: \"Who prescribed it?\" Reason: if prescribed by specialist, call should be referred to that group.      Veronica    5.  SEVERITY: If symptoms are present, ask, \"Are they mild, moderate or severe?\"  (Caution: Triage is required if symptoms are more than mild)      \"Very severe\"    Protocols used: MEDICATION QUESTION CALL-P-AH, MEDICATION QUESTIONS - GUIDELINE [id number]      "

## 2019-12-11 NOTE — TELEPHONE ENCOUNTER
Reason for call:  Patient reporting a symptom    Symptom or request: gerd    Duration (how long have symptoms been present): onging    Have you been treated for this before? Yes    Additional comments: meds not working    Phone Number patient can be reached at:  Home number on file [phone] (home)    Best Time:  any    Can we leave a detailed message on this number:  YES    Call taken on 12/11/2019 at 11:31 AM by ABDULAZIZ ROUSE

## 2020-02-10 ENCOUNTER — OFFICE VISIT (OUTPATIENT)
Dept: FAMILY MEDICINE | Facility: CLINIC | Age: 60
End: 2020-02-10
Payer: COMMERCIAL

## 2020-02-10 VITALS
BODY MASS INDEX: 35 KG/M2 | RESPIRATION RATE: 16 BRPM | HEART RATE: 82 BPM | SYSTOLIC BLOOD PRESSURE: 140 MMHG | OXYGEN SATURATION: 99 % | TEMPERATURE: 98.9 F | DIASTOLIC BLOOD PRESSURE: 78 MMHG | WEIGHT: 280 LBS

## 2020-02-10 DIAGNOSIS — H66.3X2 CHRONIC SUPPURATIVE OTITIS MEDIA OF LEFT EAR, UNSPECIFIED OTITIS MEDIA LOCATION: Primary | ICD-10-CM

## 2020-02-10 DIAGNOSIS — H01.00A BLEPHARITIS OF UPPER AND LOWER EYELIDS OF BOTH EYES, UNSPECIFIED TYPE: ICD-10-CM

## 2020-02-10 DIAGNOSIS — H01.00B BLEPHARITIS OF UPPER AND LOWER EYELIDS OF BOTH EYES, UNSPECIFIED TYPE: ICD-10-CM

## 2020-02-10 PROCEDURE — 99213 OFFICE O/P EST LOW 20 MIN: CPT | Performed by: FAMILY MEDICINE

## 2020-02-10 RX ORDER — TOBRAMYCIN AND DEXAMETHASONE 3; 1 MG/ML; MG/ML
1-2 SUSPENSION/ DROPS OPHTHALMIC EVERY 4 HOURS
Qty: 10 ML | Refills: 0 | Status: SHIPPED | OUTPATIENT
Start: 2020-02-10 | End: 2021-07-06

## 2020-02-10 NOTE — PROGRESS NOTES
Subjective     Fabian Rod is a 59 year old male who presents to clinic today for the following health issues:    HPI   Eye(s) Problem  Onset: 1.5 weeks    Description:   Location: bilateral  Pain: no but itchy  Redness: YES    Accompanying Signs & Symptoms:  Discharge/mattering: no   Swelling: YES  Visual changes: no   Fever: no   Nasal Congestion: YES- sneezing   Bothered by bright lights: no    History:   Trauma: no   Foreign body exposure: no     Precipitating factors:   Wearing contacts: no     Alleviating factors:  Improved by:     Therapies Tried and outcome: metoclopramide ointment, not much change      Concern - ear problem  Onset: ongoing issue    Description:   Buzzing sound in the left ear, no pain    Intensity: mild    Progression of Symptoms:  same    Accompanying Signs & Symptoms:  See above    Previous history of similar problem:       Precipitating factors:   Worsened by:     Alleviating factors:  Improved by:     Therapies Tried and outcome:   Lt ear feels plugged all of the time        BP Readings from Last 3 Encounters:   02/10/20 (!) 140/78   10/26/19 130/78   08/02/19 138/80    Wt Readings from Last 3 Encounters:   02/10/20 127 kg (280 lb)   10/26/19 123.8 kg (273 lb)   08/02/19 124.5 kg (274 lb 8 oz)                      Reviewed and updated as needed this visit by Provider         Review of Systems   ROS COMP: CONSTITUTIONAL: NEGATIVE for fever, chills, change in weight  EYES: POSITIVE for redness bilateral  ENT/MOUTH: NEGATIVE for ear, mouth and throat problems  RESP: NEGATIVE for significant cough or SOB  CV: NEGATIVE for chest pain, palpitations or peripheral edema  MUSCULOSKELETAL: POSITIVE  for back pain low back      Objective    BP (!) 140/78   Pulse 82   Temp 98.9  F (37.2  C) (Tympanic)   Resp 16   Wt 127 kg (280 lb)   SpO2 99%   BMI 35.00 kg/m    Body mass index is 35 kg/m .  Physical Exam   GENERAL: healthy, alert and no distress  EYES: conjunctiva/corneas- blepharitis  OU  HENT: normal cephalic/atraumatic, right ear: clear effusion, left ear: bulging membrane and mucopurulent effusion, nose and mouth without ulcers or lesions, oropharynx clear and oral mucous membranes moist  NECK: no adenopathy, no asymmetry, masses, or scars and thyroid normal to palpation  RESP: lungs clear to auscultation - no rales, rhonchi or wheezes  CV: regular rate and rhythm, normal S1 S2, no S3 or S4, no murmur, click or rub, no peripheral edema and peripheral pulses strong  MS: no gross musculoskeletal defects noted, no edema    Diagnostic Test Results:  Labs reviewed in Epic  none         Assessment & Plan     Fabian was seen today for eye problem and ear problem.    Diagnoses and all orders for this visit:    Chronic suppurative otitis media of left ear, unspecified otitis media location  -     amoxicillin-clavulanate (AUGMENTIN) 875-125 MG tablet; Take 1 tablet by mouth 2 times daily  -     OTOLARYNGOLOGY REFERRAL    Blepharitis of upper and lower eyelids of both eyes, unspecified type  -     tobramycin-dexamethasone (TOBRADEX) 0.3-0.1 % ophthalmic suspension; Place 1-2 drops into both eyes every 4 hours           FUTURE APPOINTMENTS:       - Follow-up visit in 2 weeks  Refer to ENT for further evaluation of Lt ear   Patient Instructions   Warm compress to both eyes 3-4 times daily       Return in about 2 weeks (around 2/24/2020) for blepharitis both eyes, otitis media, back pain.    Eze Strong MD  [facility]

## 2020-02-21 ENCOUNTER — OFFICE VISIT (OUTPATIENT)
Dept: FAMILY MEDICINE | Facility: CLINIC | Age: 60
End: 2020-02-21
Payer: COMMERCIAL

## 2020-02-21 VITALS
WEIGHT: 263 LBS | RESPIRATION RATE: 12 BRPM | BODY MASS INDEX: 32.7 KG/M2 | HEART RATE: 68 BPM | DIASTOLIC BLOOD PRESSURE: 78 MMHG | TEMPERATURE: 98.8 F | SYSTOLIC BLOOD PRESSURE: 136 MMHG | HEIGHT: 75 IN

## 2020-02-21 DIAGNOSIS — S63.502A WRIST SPRAIN, LEFT, INITIAL ENCOUNTER: ICD-10-CM

## 2020-02-21 DIAGNOSIS — S93.492A SPRAIN OF ANTERIOR TALOFIBULAR LIGAMENT OF LEFT ANKLE, INITIAL ENCOUNTER: ICD-10-CM

## 2020-02-21 DIAGNOSIS — M77.12 EPICONDYLITIS, LATERAL, LEFT: ICD-10-CM

## 2020-02-21 DIAGNOSIS — M25.532 LEFT WRIST PAIN: Primary | ICD-10-CM

## 2020-02-21 PROCEDURE — 99213 OFFICE O/P EST LOW 20 MIN: CPT | Performed by: FAMILY MEDICINE

## 2020-02-21 RX ORDER — METHYLPREDNISOLONE 4 MG
TABLET, DOSE PACK ORAL
Qty: 21 TABLET | Refills: 0 | Status: SHIPPED | OUTPATIENT
Start: 2020-02-21 | End: 2020-03-20

## 2020-02-21 ASSESSMENT — MIFFLIN-ST. JEOR: SCORE: 2083.59

## 2020-02-21 NOTE — LETTER
February 21, 2020      Fabian Rod  [address] [place] [facility] [address]        To Whom It May Concern:    Fabian Rod was seen in our clinic. He may return to work with the following: limited to light duty - lifting no greater than 10 pounds on or about 2/26/20.    He should be able to resume regular work without restrictions as of 3/4/20      Sincerely,        Eze Strong MD

## 2020-02-21 NOTE — PROGRESS NOTES
"Subjective     Fabian Rod is a 59 year old male who presents to clinic today for the following health issues:    HPI   Musculoskeletal problem/pain      Duration: 5 days, since Sat 2/15/20    Description  Location: lt wrist     Intensity:  moderate    Accompanying signs and symptoms: swelling    History  Previous similar problem: no   Previous evaluation:  none    Precipitating or alleviating factors:  Trauma or overuse: YES- carrying heavy grocery bags, overuse  Aggravating factors include: lifting    Therapies tried and outcome: ice and support wrap    Pt has missed work this week.  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    Work has told him that he would be able to come back to work on light duty but he needs doctors note        BP Readings from Last 3 Encounters:   02/21/20 136/78   02/10/20 (!) 140/78   10/26/19 130/78    Wt Readings from Last 3 Encounters:   02/21/20 119.3 kg (263 lb)   02/10/20 127 kg (280 lb)   10/26/19 123.8 kg (273 lb)                    Reviewed and updated as needed this visit by Provider         Review of Systems   ROS COMP: CONSTITUTIONAL: NEGATIVE for fever, chills, change in weight  ENT/MOUTH: NEGATIVE for ear, mouth and throat problems  RESP: NEGATIVE for significant cough or SOB  CV: NEGATIVE for chest pain, palpitations or peripheral edema  MUSCULOSKELETAL: POSITIVE  for injury to Lt wrist and forearm      Objective    /78 (Cuff Size: Adult Large)   Pulse 68   Temp 98.8  F (37.1  C) (Tympanic)   Resp 12   Ht 1.905 m (6' 3\")   Wt 119.3 kg (263 lb)   BMI 32.87 kg/m    Body mass index is 32.87 kg/m .  Physical Exam   GENERAL: healthy, alert and no distress  NECK: no adenopathy, no asymmetry, masses, or scars and thyroid normal to palpation  RESP: lungs clear to auscultation - no rales, rhonchi or wheezes  CV: regular rate and rhythm, normal S1 S2, no S3 or S4, no murmur, click or rub, no " peripheral edema and peripheral pulses strong  MS: LUE exam shows no evidence of joint effusion and tenderness Lt wrist flexor and extensor tendons, moderate tenderness at base of thumb  Tender also at upper forearm and Lt lateral epicondyle    Diagnostic Test Results:  Labs reviewed in Epic  none         Assessment & Plan     Fabian was seen today for musculoskeletal problem and patient request for note/letter.    Diagnoses and all orders for this visit:    Left wrist pain  -     methylPREDNISolone 4 MG PO tablet therapy pack; Follow package instructions    Wrist sprain, left, initial encounter  -     methylPREDNISolone 4 MG PO tablet therapy pack; Follow package instructions    Sprain of anterior talofibular ligament of left ankle, initial encounter    Epicondylitis, lateral, left       Pt fitted for Wrist splint Lt wrist.  Instructed to wear day and night for at least the next week    FUTURE APPOINTMENTS:       - Follow-up visit in 2 weeks if not resolved  Letter for return to work limited duty next week for 1 week  Patient Instructions   Alternate ice & heat.  Use Ice massage on trigger point areas.  Do gentle Range of motion exercises      Return in about 2 weeks (around 3/6/2020) for wrist pain.    Eze Strong MD  [facility]

## 2020-03-02 ENCOUNTER — TELEPHONE (OUTPATIENT)
Dept: FAMILY MEDICINE | Facility: CLINIC | Age: 60
End: 2020-03-02

## 2020-03-02 NOTE — TELEPHONE ENCOUNTER
Reason for Call:  Other     Detailed comments: updated doctors note.  Light duty for another week  Wrist not better    Phone Number Patient can be reached at: Home number on file [phone] (home)    Best Time: today    Can we leave a detailed message on this number? YES    Call taken on 3/2/2020 at 9:10 AM by GUZMAN FORTUNE

## 2020-03-02 NOTE — TELEPHONE ENCOUNTER
Pt requesting an extending of work note for light duty until 03/10.  Pt states he will be coming into clinic to pick note up.    Pended note for you. OK to print and have pt ?    Pt also states understanding to make appt around 03/06 per last OV if not feeling better.

## 2020-03-02 NOTE — TELEPHONE ENCOUNTER
Yes I will ok him being on restricted duties at work thru 3/10/20  Letter printed given to [employer] staff to place at  for Pt

## 2020-03-02 NOTE — LETTER
March 02, 2020      Fabian Rod  [address] [facility] [address]        To Whom It May Concern:    Fabian Rod was seen in our clinic. He can return to work with the following: limited to light duty - lifting no greater than 10 pounds on or about 03/10/20.    He can resume regular work without restrictions as of 03/11/20.       Sincerely,        Eze Strong MD

## 2020-03-06 ENCOUNTER — TELEPHONE (OUTPATIENT)
Dept: FAMILY MEDICINE | Facility: CLINIC | Age: 60
End: 2020-03-06

## 2020-03-06 NOTE — TELEPHONE ENCOUNTER
Please see note below.     Current note:  Fabian Rod was seen in our clinic. He may return to work with the following: limited to light duty - lifting no greater than 10 pounds on or about 03/10/20.    Can his be updated to state, can return to work with no restrictions 3/11/2020.

## 2020-03-06 NOTE — TELEPHONE ENCOUNTER
"Reason for Call:  Other Letter    Detailed comments: The patient called and stated that he needs a letter from Dr. Strong stating that he can return to work with no restrictions starting on 3/11/20.  The previous letter that Dr. Strong sent stated that the patient \"should be able\" to return but the patient's work wants specific wording stating that he \"can\" return to work. The patient would like a call when it is ready so he can come to the [facility] clinic.     Phone Number Patient can be reached at: Home number on file [phone] (home)    Best Time: Any    Can we leave a detailed message on this number? YES    Call taken on 3/6/2020 at 11:19 AM by Cintia Grubbs      "

## 2020-03-06 NOTE — TELEPHONE ENCOUNTER
Letter was updated and placed at the front dest at [facility].     Patient was notified to come and  the letter.

## 2020-03-07 DIAGNOSIS — N52.8 OTHER MALE ERECTILE DYSFUNCTION: ICD-10-CM

## 2020-03-07 NOTE — TELEPHONE ENCOUNTER
"Requested Prescriptions   Pending Prescriptions Disp Refills     sildenafil (VIAGRA) 100 MG tablet [Pharmacy Med Name: SILDENAFIL TABS 100MG]    Last Written Prescription Date:  10/21/2019  Last Fill Quantity: 6 tablet,  # refills: 3   Last office visit: 2/21/2020 with prescribing provider:  Ligia   Future Office Visit:     6 tablet 23     Sig: TAKE 1 TABLET EVERY 3 DAYS       Erectile Dysfuction Protocol Passed - 3/7/2020 10:20 AM        Passed - Absence of nitrates on medication list        Passed - Absence of Alpha Blockers on Med list        Passed - Recent (12 mo) or future (30 days) visit within the authorizing provider's specialty     Patient has had an office visit with the authorizing provider or a provider within the authorizing providers department within the previous 12 mos or has a future within next 30 days. See \"Patient Info\" tab in inbasket, or \"Choose Columns\" in Meds & Orders section of the refill encounter.              Passed - Medication is active on med list        Passed - Patient is age 18 or older           "

## 2020-03-09 RX ORDER — SILDENAFIL 100 MG/1
TABLET, FILM COATED ORAL
Qty: 6 TABLET | Refills: 2 | Status: SHIPPED | OUTPATIENT
Start: 2020-03-09 | End: 2020-09-30

## 2020-03-20 ENCOUNTER — OFFICE VISIT (OUTPATIENT)
Dept: FAMILY MEDICINE | Facility: CLINIC | Age: 60
End: 2020-03-20
Payer: COMMERCIAL

## 2020-03-20 VITALS — WEIGHT: 273 LBS | BODY MASS INDEX: 34.12 KG/M2 | RESPIRATION RATE: 16 BRPM

## 2020-03-20 DIAGNOSIS — M1A.00X0 IDIOPATHIC CHRONIC GOUT WITHOUT TOPHUS, UNSPECIFIED SITE: ICD-10-CM

## 2020-03-20 DIAGNOSIS — M25.532 LEFT WRIST PAIN: Primary | ICD-10-CM

## 2020-03-20 LAB — ERYTHROCYTE [SEDIMENTATION RATE] IN BLOOD BY WESTERGREN METHOD: 24 MM/H (ref 0–20)

## 2020-03-20 PROCEDURE — 84550 ASSAY OF BLOOD/URIC ACID: CPT | Performed by: FAMILY MEDICINE

## 2020-03-20 PROCEDURE — 85652 RBC SED RATE AUTOMATED: CPT | Performed by: FAMILY MEDICINE

## 2020-03-20 PROCEDURE — 36415 COLL VENOUS BLD VENIPUNCTURE: CPT | Performed by: FAMILY MEDICINE

## 2020-03-20 PROCEDURE — 99214 OFFICE O/P EST MOD 30 MIN: CPT | Performed by: FAMILY MEDICINE

## 2020-03-20 RX ORDER — METHYLPREDNISOLONE 4 MG
TABLET, DOSE PACK ORAL
Qty: 21 TABLET | Refills: 0 | Status: SHIPPED | OUTPATIENT
Start: 2020-03-20 | End: 2021-07-06

## 2020-03-20 NOTE — PROGRESS NOTES
Subjective     Fabian Rod is a 59 year old male who presents to clinic today for the following health issues:    HPI   Musculoskeletal problem/pain      Duration: 6 days    Description  Location: lt wrist    Intensity:  moderate    Accompanying signs and symptoms: swelling    History  Previous similar problem: YES  Previous evaluation:  none    Precipitating or alleviating factors:  Trauma or overuse: YES- recent injury 1 month ago  Aggravating factors include: lifting    Therapies tried and outcome: support wrap, rest, and ice    Pt had done well, returned to work as of 3/10/20 without restrictions.  He has been doing very long hours, heavy lifting.  On Sat 3/14 wrist started being more painful.  His work week is from Wed thru Sat.  He rested Sun thru Tuesday and did usual work on Wed again.  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    Work has told him that he would be able to come back to work on light duty but he needs doctors note        BP Readings from Last 3 Encounters:   02/21/20 136/78   02/10/20 (!) 140/78   10/26/19 130/78    Wt Readings from Last 3 Encounters:   03/20/20 123.8 kg (273 lb)   02/21/20 119.3 kg (263 lb)   02/10/20 127 kg (280 lb)                    Reviewed and updated as needed this visit by Provider         Review of Systems   ROS COMP: CONSTITUTIONAL: NEGATIVE for fever, chills, change in weight  ENT/MOUTH: NEGATIVE for ear, mouth and throat problems  RESP: NEGATIVE for significant cough or SOB  CV: NEGATIVE for chest pain, palpitations or peripheral edema  MUSCULOSKELETAL: POSITIVE  for injury to Lt wrist and forearm      Objective    Resp 16   Wt 123.8 kg (273 lb)   BMI 34.12 kg/m    Body mass index is 34.12 kg/m .  Physical Exam   GENERAL: healthy, alert and no distress  NECK: no adenopathy, no asymmetry, masses, or scars and thyroid normal to palpation  RESP: lungs clear  to auscultation - no rales, rhonchi or wheezes  CV: regular rate and rhythm, normal S1 S2, no S3 or S4, no murmur, click or rub, no peripheral edema and peripheral pulses strong  MS: LUE exam shows no evidence of joint effusion and tenderness Lt wrist flexor and extensor tendons, moderate tenderness at base of thumb  Tender also at upper forearm and Lt lateral epicondyle    Diagnostic Test Results:  Labs reviewed in Epic  Lab: see below, results pending        Assessment & Plan     Fabian was seen today for musculoskeletal problem.    Diagnoses and all orders for this visit:    Left wrist pain  -     methylPREDNISolone (MEDROL DOSEPAK) 4 MG tablet therapy pack; Follow package instructions  -     ESR: Erythrocyte sedimentation rate    Idiopathic chronic gout without tophus, unspecified site  -     Uric acid       Pt still has the wrist splint from previous visit.  He will be wearing that steadily for at least this next week    If uric acid level is not improved, he will need to increase the Allopuriniol  FUTURE APPOINTMENTS:       - Follow-up visit in 3 weeks if not resolved  Letter for return to work limited duty next week for 1 week  Patient Instructions   Wear wrist splint steadily at work  Frequent ice packs to wrist       Return in about 3 weeks (around 4/10/2020).    Eze Strong MD  [facility]

## 2020-03-20 NOTE — LETTER
March 23, 2020      Fabian Rod  [address] [place] [address]        Dear ,    We are writing to inform you of your test results.    Uric acid level improved, now into upper normal range. Would be better yet to have lower reading  Sed rate is mildly elevated    Increase the Allopurinol to 200 mg daily, take 2 of the 100 mg pills daily    Resulted Orders   Uric acid   Result Value Ref Range    Uric Acid 6.8 3.5 - 7.2 mg/dL   ESR: Erythrocyte sedimentation rate   Result Value Ref Range    Sed Rate 24 (H) 0 - 20 mm/h       If you have any questions or concerns, please call the clinic at the number listed above.       Sincerely,        Eze Strong MD

## 2020-03-20 NOTE — LETTER
March 20, 2020      Fabian Rod  [address] [place] [facility] [address]        To Whom It May Concern:    Fabian Rod was seen in our clinic. He may return to work with the following: limited to light duty - wearing Lt wrist splint, no lifting over 10#  onSat 3/21/20.  He can return to work without restrictions as of April 1, 2020      Sincerely,        Eze Strong MD

## 2020-03-21 LAB — URATE SERPL-MCNC: 6.8 MG/DL (ref 3.5–7.2)

## 2020-03-30 ENCOUNTER — TELEPHONE (OUTPATIENT)
Dept: FAMILY MEDICINE | Facility: CLINIC | Age: 60
End: 2020-03-30

## 2020-03-30 NOTE — TELEPHONE ENCOUNTER
Letter faxed to requested number.     Patient Contact    Called patient and informed letter was faxed to requested number. No further action needed.

## 2020-03-30 NOTE — TELEPHONE ENCOUNTER
Reason for Call:  Other   Updated return to work letter    Detailed comments: Patient had a wrist injury and had a letter from Dr Eze Strong to that effect   His company is requiring an updated letter with today's date     He will obtain fax number from his employer and call us back so we can fax it to his employer     Phone Number Patient can be reached at: Home number on file [phone] (home)    Best Time: anytime    Can we leave a detailed message on this number? YES    Call taken on 3/30/2020 at 2:09 PM by ARDEN CALLAHAN

## 2020-03-30 NOTE — TELEPHONE ENCOUNTER
Patient Contact    Patient states he needs same letter as 3/20/2020, but it needs to have today's date on it. Saved letter, routing to provider to print/sign.    Triage: Letter can be faxed to: [phone]. [place] and attention: Pradeep Kemp.

## 2020-03-30 NOTE — LETTER
[facility]  [address]  [facility] [address]  [phone]                                                                                                           Fabian Rod  [address] [facility] [address]    March 30, 2020      To Whom It May Concern:      Fabian Rod was seen in our clinic. He may return to work with the following: limited to light duty - wearing Lt wrist splint, no lifting over 10#  onSat 3/21/20.  He can return to work without restrictions as of April 1, 2020        Sincerely,    Eze Strong MD

## 2020-07-14 NOTE — TELEPHONE ENCOUNTER
Pt to ER from home for  Vertigo for the last 24 hrs. Nausea and vomiting that began this afternoon. 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     Pt would like letter mailed out.

## 2020-09-30 DIAGNOSIS — N52.8 OTHER MALE ERECTILE DYSFUNCTION: ICD-10-CM

## 2020-09-30 RX ORDER — SILDENAFIL 100 MG/1
TABLET, FILM COATED ORAL
Qty: 30 TABLET | Refills: 0 | Status: SHIPPED | OUTPATIENT
Start: 2020-09-30 | End: 2020-11-11

## 2020-10-07 DIAGNOSIS — K21.9 GASTROESOPHAGEAL REFLUX DISEASE WITHOUT ESOPHAGITIS: ICD-10-CM

## 2020-10-08 RX ORDER — SUCRALFATE 1 G/1
TABLET ORAL
Qty: 360 TABLET | Refills: 1 | Status: SHIPPED | OUTPATIENT
Start: 2020-10-08 | End: 2021-04-07

## 2020-11-10 DIAGNOSIS — N52.8 OTHER MALE ERECTILE DYSFUNCTION: ICD-10-CM

## 2020-11-11 RX ORDER — SILDENAFIL 100 MG/1
TABLET, FILM COATED ORAL
Qty: 30 TABLET | Refills: 0 | Status: SHIPPED | OUTPATIENT
Start: 2020-11-11 | End: 2021-01-13

## 2021-01-10 DIAGNOSIS — N52.8 OTHER MALE ERECTILE DYSFUNCTION: ICD-10-CM

## 2021-01-13 RX ORDER — SILDENAFIL 100 MG/1
TABLET, FILM COATED ORAL
Qty: 30 TABLET | Refills: 1 | Status: SHIPPED | OUTPATIENT
Start: 2021-01-13 | End: 2022-07-19

## 2021-04-05 DIAGNOSIS — K21.9 GASTROESOPHAGEAL REFLUX DISEASE WITHOUT ESOPHAGITIS: ICD-10-CM

## 2021-04-05 NOTE — LETTER
[address] [address]  [phone]      4/9/2021       Fabian Rod  [address] [facility] [place] [facility] [address]        Dear Fabian,  You are overdue for an Annual Eaxm before your prescriptions can be approved for refills. Please call to schedule at the number listed above.   Due to you Primary Physician (Dr Eze Strong) retiring, you will need to schedule with one of his Fellow Partners either in person, by telephone or by video.       Sincerely,      [facility]   Internal Medicine

## 2021-04-07 RX ORDER — SUCRALFATE 1 G/1
TABLET ORAL
Qty: 360 TABLET | Refills: 0 | Status: SHIPPED | OUTPATIENT
Start: 2021-04-07 | End: 2022-07-19

## 2021-04-07 NOTE — TELEPHONE ENCOUNTER
Patient due for fasting office visit- 90 days supply given.  Routing to team to schedule appointment     Regina FAIR RN  [facility]  [phone]

## 2021-05-26 DIAGNOSIS — M1A.00X0 IDIOPATHIC CHRONIC GOUT WITHOUT TOPHUS, UNSPECIFIED SITE: ICD-10-CM

## 2021-05-26 DIAGNOSIS — I10 ESSENTIAL HYPERTENSION, BENIGN: ICD-10-CM

## 2021-05-28 RX ORDER — LISINOPRIL AND HYDROCHLOROTHIAZIDE 12.5; 2 MG/1; MG/1
TABLET ORAL
Qty: 180 TABLET | Refills: 3 | OUTPATIENT
Start: 2021-05-28

## 2021-05-28 RX ORDER — ALLOPURINOL 100 MG/1
TABLET ORAL
Qty: 90 TABLET | Refills: 3 | OUTPATIENT
Start: 2021-05-28

## 2021-07-05 DIAGNOSIS — K21.9 GASTROESOPHAGEAL REFLUX DISEASE WITHOUT ESOPHAGITIS: ICD-10-CM

## 2021-07-05 RX ORDER — LISINOPRIL AND HYDROCHLOROTHIAZIDE 12.5; 2 MG/1; MG/1
2 TABLET ORAL DAILY
Qty: 180 TABLET | Refills: 3 | Status: SHIPPED | OUTPATIENT
Start: 2021-07-05 | End: 2022-06-23

## 2021-07-05 RX ORDER — SUCRALFATE 1 G/1
TABLET ORAL
Qty: 360 TABLET | Refills: 0 | Status: CANCELLED | OUTPATIENT
Start: 2021-07-05

## 2021-07-05 RX ORDER — ALLOPURINOL 100 MG/1
100 TABLET ORAL DAILY
Qty: 90 TABLET | Refills: 3 | Status: SHIPPED | OUTPATIENT
Start: 2021-07-05 | End: 2022-06-23

## 2021-07-06 PROBLEM — N18.4 CKD (CHRONIC KIDNEY DISEASE) STAGE 4, GFR 15-29 ML/MIN (H): Status: ACTIVE | Noted: 2019-10-26

## 2021-07-06 PROBLEM — N18.31 STAGE 3A CHRONIC KIDNEY DISEASE (H): Status: ACTIVE | Noted: 2019-10-26

## 2021-07-12 DIAGNOSIS — K21.9 GASTROESOPHAGEAL REFLUX DISEASE WITHOUT ESOPHAGITIS: ICD-10-CM

## 2021-07-12 NOTE — LETTER
[address] [address]  [phone]      7/23/2021       Fabian Rod  [address] [place] [address]        Dear Fabian,  You are overdue for an Annual Eaxm before your prescriptions can be approved for refills. Please call to schedule at the number listed above.   Due to you Primary Physician (Dr Eze Strong) retiring, you will need to schedule with one of his Fellow Partners either in person, by telephone or by video.       Sincerely,      [facility]   Internal Medicine'

## 2021-07-14 RX ORDER — SUCRALFATE 1 G/1
TABLET ORAL
Qty: 360 TABLET | Refills: 0 | OUTPATIENT
Start: 2021-07-14

## 2021-07-14 NOTE — TELEPHONE ENCOUNTER
Routing refill request to provider for review/approval because:  Patient needs to be seen because it has been more than 1 year since last office visit, was a Dr. Strong patient.     Also routing to TC to reach out and schedule patient.    Bethanie Diaz RN    [facility]

## 2022-06-15 DIAGNOSIS — M1A.00X0 IDIOPATHIC CHRONIC GOUT WITHOUT TOPHUS, UNSPECIFIED SITE: ICD-10-CM

## 2022-06-15 DIAGNOSIS — I10 ESSENTIAL HYPERTENSION, BENIGN: ICD-10-CM

## 2022-06-16 NOTE — TELEPHONE ENCOUNTER
Called and spoke to the patient due to the fact that the patient needs to establish care with a new provider (previous Dr. Strong patient). Patient states he will need to do some research and then he will call the clinic back to schedule an appointment. Once appointment has been scheduled, will route refill request to provider for refusal or approval.    Beverly Sánchez RN

## 2022-06-21 RX ORDER — LISINOPRIL AND HYDROCHLOROTHIAZIDE 12.5; 2 MG/1; MG/1
2 TABLET ORAL DAILY
Qty: 180 TABLET | Refills: 3 | OUTPATIENT
Start: 2022-06-21

## 2022-06-21 RX ORDER — ALLOPURINOL 100 MG/1
100 TABLET ORAL DAILY
Qty: 90 TABLET | Refills: 3 | OUTPATIENT
Start: 2022-06-21

## 2022-06-21 NOTE — TELEPHONE ENCOUNTER
Appointments in Next Year    Jul 19, 2022  4:30 PM  (Arrive by 4:10 PM)  Adult Preventative Visit with Dayday Jarrell MD  [facility] ) [phone]        Rx to be reviewed for a raphael refill.

## 2022-06-23 NOTE — TELEPHONE ENCOUNTER
Patient scheduled future appt with Dr. Jarrell at [facility]. Refill forwarded to new provider.

## 2022-06-24 RX ORDER — LISINOPRIL AND HYDROCHLOROTHIAZIDE 12.5; 2 MG/1; MG/1
2 TABLET ORAL DAILY
Qty: 60 TABLET | Refills: 0 | Status: SHIPPED | OUTPATIENT
Start: 2022-06-24 | End: 2022-07-19

## 2022-06-24 RX ORDER — ALLOPURINOL 100 MG/1
100 TABLET ORAL DAILY
Qty: 30 TABLET | Refills: 0 | Status: SHIPPED | OUTPATIENT
Start: 2022-06-24 | End: 2022-07-19

## 2022-07-19 ENCOUNTER — OFFICE VISIT (OUTPATIENT)
Dept: FAMILY MEDICINE | Facility: CLINIC | Age: 62
End: 2022-07-19

## 2022-07-19 VITALS
OXYGEN SATURATION: 96 % | SYSTOLIC BLOOD PRESSURE: 166 MMHG | DIASTOLIC BLOOD PRESSURE: 86 MMHG | WEIGHT: 300 LBS | TEMPERATURE: 96.8 F | RESPIRATION RATE: 16 BRPM | HEIGHT: 75 IN | HEART RATE: 92 BPM | BODY MASS INDEX: 37.3 KG/M2

## 2022-07-19 DIAGNOSIS — E66.01 MORBID OBESITY (H): ICD-10-CM

## 2022-07-19 DIAGNOSIS — Z12.11 SCREEN FOR COLON CANCER: ICD-10-CM

## 2022-07-19 DIAGNOSIS — I10 ESSENTIAL HYPERTENSION, BENIGN: ICD-10-CM

## 2022-07-19 DIAGNOSIS — M1A.00X0 IDIOPATHIC CHRONIC GOUT WITHOUT TOPHUS, UNSPECIFIED SITE: ICD-10-CM

## 2022-07-19 DIAGNOSIS — K21.9 GASTROESOPHAGEAL REFLUX DISEASE WITHOUT ESOPHAGITIS: ICD-10-CM

## 2022-07-19 DIAGNOSIS — Z00.00 ENCOUNTER FOR PREVENTIVE CARE: Primary | ICD-10-CM

## 2022-07-19 DIAGNOSIS — Z23 HIGH PRIORITY FOR 2019-NCOV VACCINE: ICD-10-CM

## 2022-07-19 DIAGNOSIS — N52.8 OTHER MALE ERECTILE DYSFUNCTION: ICD-10-CM

## 2022-07-19 DIAGNOSIS — Z11.4 SCREENING FOR HIV (HUMAN IMMUNODEFICIENCY VIRUS): ICD-10-CM

## 2022-07-19 PROCEDURE — 80061 LIPID PANEL: CPT | Performed by: INTERNAL MEDICINE

## 2022-07-19 PROCEDURE — 85018 HEMOGLOBIN: CPT | Performed by: INTERNAL MEDICINE

## 2022-07-19 PROCEDURE — 85004 AUTOMATED DIFF WBC COUNT: CPT | Performed by: INTERNAL MEDICINE

## 2022-07-19 PROCEDURE — 80053 COMPREHEN METABOLIC PANEL: CPT | Performed by: INTERNAL MEDICINE

## 2022-07-19 PROCEDURE — 99214 OFFICE O/P EST MOD 30 MIN: CPT | Mod: 25 | Performed by: INTERNAL MEDICINE

## 2022-07-19 PROCEDURE — 36415 COLL VENOUS BLD VENIPUNCTURE: CPT | Performed by: INTERNAL MEDICINE

## 2022-07-19 PROCEDURE — 99396 PREV VISIT EST AGE 40-64: CPT | Mod: 25 | Performed by: INTERNAL MEDICINE

## 2022-07-19 PROCEDURE — 0054A COVID-19,PF,PFIZER (12+ YRS): CPT | Performed by: INTERNAL MEDICINE

## 2022-07-19 PROCEDURE — 85041 AUTOMATED RBC COUNT: CPT | Performed by: INTERNAL MEDICINE

## 2022-07-19 PROCEDURE — 87389 HIV-1 AG W/HIV-1&-2 AB AG IA: CPT | Performed by: INTERNAL MEDICINE

## 2022-07-19 PROCEDURE — 91305 COVID-19,PF,PFIZER (12+ YRS): CPT | Performed by: INTERNAL MEDICINE

## 2022-07-19 PROCEDURE — 85048 AUTOMATED LEUKOCYTE COUNT: CPT | Performed by: INTERNAL MEDICINE

## 2022-07-19 PROCEDURE — 85014 HEMATOCRIT: CPT | Performed by: INTERNAL MEDICINE

## 2022-07-19 PROCEDURE — 84443 ASSAY THYROID STIM HORMONE: CPT | Performed by: INTERNAL MEDICINE

## 2022-07-19 PROCEDURE — G0103 PSA SCREENING: HCPCS | Performed by: INTERNAL MEDICINE

## 2022-07-19 RX ORDER — LISINOPRIL AND HYDROCHLOROTHIAZIDE 12.5; 2 MG/1; MG/1
2 TABLET ORAL DAILY
Qty: 180 TABLET | Refills: 1 | Status: SHIPPED | OUTPATIENT
Start: 2022-07-19 | End: 2023-01-24

## 2022-07-19 RX ORDER — SILDENAFIL 100 MG/1
TABLET, FILM COATED ORAL
Qty: 30 TABLET | Refills: 3 | Status: SHIPPED | OUTPATIENT
Start: 2022-07-19 | End: 2023-01-24

## 2022-07-19 RX ORDER — ALLOPURINOL 100 MG/1
100 TABLET ORAL DAILY
Qty: 90 TABLET | Refills: 3 | Status: SHIPPED | OUTPATIENT
Start: 2022-07-19 | End: 2023-01-24

## 2022-07-19 RX ORDER — AMLODIPINE BESYLATE 2.5 MG/1
2.5 TABLET ORAL DAILY
Qty: 90 TABLET | Refills: 0 | Status: SHIPPED | OUTPATIENT
Start: 2022-07-19 | End: 2023-01-24

## 2022-07-19 RX ORDER — SUCRALFATE 1 G/1
TABLET ORAL
Qty: 360 TABLET | Refills: 3 | Status: SHIPPED | OUTPATIENT
Start: 2022-07-19 | End: 2023-02-02

## 2022-07-19 ASSESSMENT — ENCOUNTER SYMPTOMS
CHILLS: 0
HEARTBURN: 0
WEAKNESS: 0
ABDOMINAL PAIN: 0
DIARRHEA: 0
HEADACHES: 0
PARESTHESIAS: 0
DYSURIA: 0
ARTHRALGIAS: 1
DIZZINESS: 0
FREQUENCY: 1
CONSTIPATION: 0
EYE PAIN: 0
SHORTNESS OF BREATH: 0
FEVER: 0
COUGH: 0
JOINT SWELLING: 0
NAUSEA: 0
MYALGIAS: 0
HEMATURIA: 0
HEMATOCHEZIA: 0
SORE THROAT: 0
NERVOUS/ANXIOUS: 0
PALPITATIONS: 0

## 2022-07-19 ASSESSMENT — PAIN SCALES - GENERAL: PAINLEVEL: NO PAIN (0)

## 2022-07-19 NOTE — PROGRESS NOTES
SUBJECTIVE:   CC: Fabian Rod is an 62 year old male who presents for preventative health visit.   New to me, seen elsewhere in the system    Gout  Stable on low dose allopurinol.     HTN  The patient has a history of hypertension.  Blood pressure is noted as below.  Compliance with medication is noted.  Side effects of medication are not described.  Symptoms of uncontrolled hypertension including chest pain, dizziness, and vision changes have not been observed.ELEVATED BP ON MULTIPLE CHECKS    ED:  viagra prn.  Desired effect.     GERD:  Ongoing. Has tried zantac, prilosec.  No improvement.  Notes improvement with carafate.      Obesity:  Morbid.  Has gained 30+pounds in the last 2.5 years.  Retired.  Eating more.  Moving less.      Patient has been advised of split billing requirements and indicates understanding: Yes  Healthy Habits:     Getting at least 3 servings of Calcium per day:  Yes    Bi-annual eye exam:  Yes    Dental care twice a year:  NO    Sleep apnea or symptoms of sleep apnea:  Daytime drowsiness    Diet:  Regular (no restrictions)    Frequency of exercise:  4-5 days/week    Duration of exercise:  15-30 minutes    Taking medications regularly:  Yes    Medication side effects:  None    PHQ-2 Total Score: 0    Additional concerns today:  Yes        Today's PHQ-2 Score:   PHQ-2 ( 1999 Pfizer) 7/19/2022   Q1: Little interest or pleasure in doing things 0   Q2: Feeling down, depressed or hopeless 0   PHQ-2 Score 0   PHQ-2 Total Score (12-17 Years)- Positive if 3 or more points; Administer PHQ-A if positive -   Q1: Little interest or pleasure in doing things Not at all   Q2: Feeling down, depressed or hopeless Not at all   PHQ-2 Score 0       Abuse: Current or Past(Physical, Sexual or Emotional)- No  Do you feel safe in your environment? Yes    Have you ever done Advance Care Planning? (For example, a Health Directive, POLST, or a discussion with a medical provider or your loved ones about your wishes):  No, advance care planning information given to patient to review.  Patient plans to discuss their wishes with loved ones or provider.      Social History     Tobacco Use     Smoking status: Former Smoker     Packs/day: 0.00     Start date:      Quit date:      Years since quittin.5     Smokeless tobacco: Never Used   Substance Use Topics     Alcohol use: Yes     Alcohol/week: 10.0 standard drinks     Types: 12 Cans of beer per week     Comment: Per week     If you drink alcohol do you typically have >3 drinks per day or >7 drinks per week? Yes      Alcohol Use 2022   Prescreen: >3 drinks/day or >7 drinks/week? Yes   Prescreen: >3 drinks/day or >7 drinks/week? -     AUDIT - Alcohol Use Disorders Identification Test - Reproduced from the World Health Organization Audit 2001 (Second Edition) 2022   1.  How often do you have a drink containing alcohol? 2 to 4 times a month   2.  How many drinks containing alcohol do you have on a typical day when you are drinking? 1 or 2   3.  How often do you have five or more drinks on one occasion? Never   9.  Have you or someone else been injured because of your drinking? No   10. Has a relative, friend, doctor or other health care worker been concerned about your drinking or suggested you cut down? No       Last PSA: No results found for: PSA    Reviewed orders with patient. Reviewed health maintenance and updated orders accordingly - Yes  Lab work is in process    Reviewed and updated as needed this visit by clinical staff   Tobacco  Allergies                 Reviewed and updated as needed this visit by Provider                       Review of Systems   Constitutional: Negative for chills and fever.   HENT: Positive for hearing loss. Negative for congestion, ear pain and sore throat.    Eyes: Negative for pain and visual disturbance.   Respiratory: Negative for cough and shortness of breath.    Cardiovascular: Negative for chest pain, palpitations and  "peripheral edema.   Gastrointestinal: Negative for abdominal pain, constipation, diarrhea, heartburn, hematochezia and nausea.   Genitourinary: Positive for frequency and impotence. Negative for dysuria, genital sores, hematuria, penile discharge and urgency.   Musculoskeletal: Positive for arthralgias. Negative for joint swelling and myalgias.   Skin: Negative for rash.   Neurological: Negative for dizziness, weakness, headaches and paresthesias.   Psychiatric/Behavioral: Negative for mood changes. The patient is not nervous/anxious.          OBJECTIVE:   BP (!) 166/86   Pulse 92   Temp 96.8  F (36  C) (Temporal)   Resp 16   Ht 1.905 m (6' 3\")   Wt 136.1 kg (300 lb)   SpO2 96%   BMI 37.50 kg/m     Wt Readings from Last 3 Encounters:   07/19/22 136.1 kg (300 lb)   03/20/20 123.8 kg (273 lb)   02/21/20 119.3 kg (263 lb)         Physical Exam  GENERAL: healthy, alert and no distress  EYES: Eyes grossly normal to inspection, PERRL and conjunctivae and sclerae normal  HENT: ear canals and TM's normal, nose and mouth without ulcers or lesions  NECK: no adenopathy, no asymmetry, masses, or scars and thyroid normal to palpation  RESP: lungs clear to auscultation - no rales, rhonchi or wheezes  CV: regular rate and rhythm, normal S1 S2, no S3 or S4, no murmur, click or rub, no peripheral edema and peripheral pulses strong  ABDOMEN: soft, nontender, no hepatosplenomegaly, no masses and bowel sounds normal  MS: no gross musculoskeletal defects noted, no edema  SKIN: no suspicious lesions or rashes  NEURO: Normal strength and tone, mentation intact and speech normal  PSYCH: mentation appears normal, affect normal/bright        ASSESSMENT/PLAN:       ICD-10-CM    1. Encounter for preventive care   Age and gender appropriate preventive care and screenings are discussed.  Particular attention to personal preventive care and age appropriate lifestyle including the incorporation of healthy diet and physical activity is made. " "    Z00.00 CBC with Platelets & Differential     Comprehensive metabolic panel     TSH with free T4 reflex     Lipid panel reflex to direct LDL Fasting     PSA, screen   2. Essential hypertension, benign   High.  May be due to wt gain.  Add norvasc. RTC 4 weeks for recheck.   I10 lisinopril-hydrochlorothiazide (ZESTORETIC) 20-12.5 MG tablet     amLODIPine (NORVASC) 2.5 MG tablet   3. Idiopathic chronic gout without tophus, unspecified site   Med refilled.  Has CKD M1A.00X0 allopurinol (ZYLOPRIM) 100 MG tablet   4. Screen for colon cancer   Options discussed. Pt opts for cologuard.   Z12.11 COLOGUARD([employer])   5. Screening for HIV (human immunodeficiency virus)  Z11.4 HIV Antigen Antibody Combo   6. Gastroesophageal reflux disease without esophagitis   Carafate refilled.  Consider GI consult/UGI in future K21.9 sucralfate (CARAFATE) 1 GM tablet   7. Other male erectile dysfunction   Sildenafil refilled N52.8 sildenafil (VIAGRA) 100 MG tablet   8. Morbid obesity (H)   Lifestyle changes discussed. Currently drinks 24 oz soda a day.  Swap for diet soda. Portion control with more proteins and vegetables.  RTC 1 month. 10lb weight loss goal in that month.   E66.01            COUNSELING:   Reviewed preventive health counseling, as reflected in patient instructions    Estimated body mass index is 37.5 kg/m  as calculated from the following:    Height as of this encounter: 1.905 m (6' 3\").    Weight as of this encounter: 136.1 kg (300 lb).     Weight management plan: see above    He reports that he quit smoking about 37 years ago. He started smoking about 38 years ago. He smoked 0.00 packs per day. He has never used smokeless tobacco.      Counseling Resources:  ATP IV Guidelines  Pooled Cohorts Equation Calculator  FRAX Risk Assessment  ICSI Preventive Guidelines  Dietary Guidelines for Americans, 2010  USDA's MyPlate  ASA Prophylaxis  Lung CA Screening    Dayday Jarrell MD  [facility]  "

## 2022-07-19 NOTE — PATIENT INSTRUCTIONS
COVID booster (Pfizer)    LABS today    I also ordered cologuard.  They will reach out to you    START:  AMLODIPINE 2.5mg every evening    SEE me again in a month

## 2022-07-19 NOTE — LETTER
July 22, 2022      Fabian Rod  [address] [place] [address]        Dear ,    We are writing to inform you of your test results.    Fabian, your labs show a high cholesterol.  Also, liver enzymes are a little on the high side.  We should discuss tackling both these issues when I see you next month.  In the meantime, keep working on the weight loss.  If you consume alcohol I would suggest stopping or at least reducing.         Resulted Orders   HIV Antigen Antibody Combo   Result Value Ref Range    HIV Antigen Antibody Combo Nonreactive Nonreactive      Comment:      HIV-1 p24 Ag & HIV-1/HIV-2 Ab Not Detected   Comprehensive metabolic panel   Result Value Ref Range    Sodium 136 133 - 144 mmol/L    Potassium 4.5 3.4 - 5.3 mmol/L    Chloride 107 94 - 109 mmol/L    Carbon Dioxide (CO2) 17 (L) 20 - 32 mmol/L    Anion Gap 12 3 - 14 mmol/L    Urea Nitrogen 30 7 - 30 mg/dL    Creatinine 2.00 (H) 0.66 - 1.25 mg/dL    Calcium 10.0 8.5 - 10.1 mg/dL    Glucose 96 70 - 99 mg/dL    Alkaline Phosphatase 58 40 - 150 U/L    AST 49 (H) 0 - 45 U/L    ALT 83 (H) 0 - 70 U/L    Protein Total 8.2 6.8 - 8.8 g/dL    Albumin 4.1 3.4 - 5.0 g/dL    Bilirubin Total 0.3 0.2 - 1.3 mg/dL    GFR Estimate 37 (L) >60 mL/min/1.73m2      Comment:      Effective December 21, 2021 eGFRcr in adults is calculated using the 2021 CKD-EPI creatinine equation which includes age and gender (Maria A et al., NE, DOI: 10.1056/XVHJei8829102)   TSH with free T4 reflex   Result Value Ref Range    TSH 2.36 0.40 - 4.00 mU/L   Lipid panel reflex to direct LDL Fasting   Result Value Ref Range    Cholesterol 287 (H) <200 mg/dL    Triglycerides 259 (H) <150 mg/dL    Direct Measure HDL 40 >=40 mg/dL    LDL Cholesterol Calculated 195 (H) <=100 mg/dL    Non HDL Cholesterol 247 (H) <130 mg/dL    Patient Fasting > 8hrs? No     Narrative    Cholesterol  Desirable:  <200 mg/dL    Triglycerides  Normal:  Less than 150 mg/dL  Borderline High:  150-199  mg/dL  High:  200-499 mg/dL  Very High:  Greater than or equal to 500 mg/dL    Direct Measure HDL  Female:  Greater than or equal to 50 mg/dL   Male:  Greater than or equal to 40 mg/dL    LDL Cholesterol  Desirable:  <100mg/dL  Above Desirable:  100-129 mg/dL   Borderline High:  130-159 mg/dL   High:  160-189 mg/dL   Very High:  >= 190 mg/dL    Non HDL Cholesterol  Desirable:  130 mg/dL  Above Desirable:  130-159 mg/dL  Borderline High:  160-189 mg/dL  High:  190-219 mg/dL  Very High:  Greater than or equal to 220 mg/dL   PSA, screen   Result Value Ref Range    Prostate Specific Antigen Screen 2.00 0.00 - 4.00 ug/L   CBC with platelets and differential   Result Value Ref Range    WBC Count 5.7 4.0 - 11.0 10e3/uL    RBC Count 5.24 4.40 - 5.90 10e6/uL    Hemoglobin 14.9 13.3 - 17.7 g/dL    Hematocrit 45.0 40.0 - 53.0 %    MCV 86 78 - 100 fL    MCH 28.4 26.5 - 33.0 pg    MCHC 33.1 31.5 - 36.5 g/dL    RDW 12.8 10.0 - 15.0 %    Platelet Count        Comment:      Platelets Clumped-Platelet Count Not Available    % Neutrophils 57 %    % Lymphocytes 31 %    % Monocytes 8 %    % Eosinophils 2 %    % Basophils 1 %    % Immature Granulocytes 1 %    NRBCs per 100 WBC 0 <1 /100    Absolute Neutrophils 3.3 1.6 - 8.3 10e3/uL    Absolute Lymphocytes 1.8 0.8 - 5.3 10e3/uL    Absolute Monocytes 0.5 0.0 - 1.3 10e3/uL    Absolute Eosinophils 0.1 0.0 - 0.7 10e3/uL    Absolute Basophils 0.1 0.0 - 0.2 10e3/uL    Absolute Immature Granulocytes 0.0 <=0.4 10e3/uL    Absolute NRBCs 0.0 10e3/uL   RBC and Platelet Morphology   Result Value Ref Range    Platelet Assessment Platelets Clumped (A) Automated Count Confirmed. Platelet morphology is normal.      Comment:      COUNT UNAVAILABLE. APPEARS WITHIN THE NORMAL RANGE.   This is an appended report.  These results have been appended to a previously final verified report.    RBC Morphology Confirmed RBC Indices        If you have any questions or concerns, please call the clinic at the number  listed above.       Sincerely,      Dayday Jarrell MD

## 2022-07-20 LAB
ALBUMIN SERPL-MCNC: 4.1 G/DL (ref 3.4–5)
ALP SERPL-CCNC: 58 U/L (ref 40–150)
ALT SERPL W P-5'-P-CCNC: 83 U/L (ref 0–70)
ANION GAP SERPL CALCULATED.3IONS-SCNC: 12 MMOL/L (ref 3–14)
AST SERPL W P-5'-P-CCNC: 49 U/L (ref 0–45)
BASOPHILS # BLD AUTO: 0.1 10E3/UL (ref 0–0.2)
BASOPHILS NFR BLD AUTO: 1 %
BILIRUB SERPL-MCNC: 0.3 MG/DL (ref 0.2–1.3)
BUN SERPL-MCNC: 30 MG/DL (ref 7–30)
CALCIUM SERPL-MCNC: 10 MG/DL (ref 8.5–10.1)
CHLORIDE BLD-SCNC: 107 MMOL/L (ref 94–109)
CHOLEST SERPL-MCNC: 287 MG/DL
CO2 SERPL-SCNC: 17 MMOL/L (ref 20–32)
CREAT SERPL-MCNC: 2 MG/DL (ref 0.66–1.25)
EOSINOPHIL # BLD AUTO: 0.1 10E3/UL (ref 0–0.7)
EOSINOPHIL NFR BLD AUTO: 2 %
ERYTHROCYTE [DISTWIDTH] IN BLOOD BY AUTOMATED COUNT: 12.8 % (ref 10–15)
FASTING STATUS PATIENT QL REPORTED: NO
GFR SERPL CREATININE-BSD FRML MDRD: 37 ML/MIN/1.73M2
GLUCOSE BLD-MCNC: 96 MG/DL (ref 70–99)
HCT VFR BLD AUTO: 45 % (ref 40–53)
HDLC SERPL-MCNC: 40 MG/DL
HGB BLD-MCNC: 14.9 G/DL (ref 13.3–17.7)
HIV 1+2 AB+HIV1 P24 AG SERPL QL IA: NONREACTIVE
IMM GRANULOCYTES # BLD: 0 10E3/UL
IMM GRANULOCYTES NFR BLD: 1 %
LDLC SERPL CALC-MCNC: 195 MG/DL
LYMPHOCYTES # BLD AUTO: 1.8 10E3/UL (ref 0.8–5.3)
LYMPHOCYTES NFR BLD AUTO: 31 %
MCH RBC QN AUTO: 28.4 PG (ref 26.5–33)
MCHC RBC AUTO-ENTMCNC: 33.1 G/DL (ref 31.5–36.5)
MCV RBC AUTO: 86 FL (ref 78–100)
MONOCYTES # BLD AUTO: 0.5 10E3/UL (ref 0–1.3)
MONOCYTES NFR BLD AUTO: 8 %
NEUTROPHILS # BLD AUTO: 3.3 10E3/UL (ref 1.6–8.3)
NEUTROPHILS NFR BLD AUTO: 57 %
NONHDLC SERPL-MCNC: 247 MG/DL
NRBC # BLD AUTO: 0 10E3/UL
NRBC BLD AUTO-RTO: 0 /100
PLAT MORPH BLD: ABNORMAL
PLATELET # BLD AUTO: NORMAL 10*3/UL
POTASSIUM BLD-SCNC: 4.5 MMOL/L (ref 3.4–5.3)
PROT SERPL-MCNC: 8.2 G/DL (ref 6.8–8.8)
PSA SERPL-MCNC: 2 UG/L (ref 0–4)
RBC # BLD AUTO: 5.24 10E6/UL (ref 4.4–5.9)
RBC MORPH BLD: ABNORMAL
SODIUM SERPL-SCNC: 136 MMOL/L (ref 133–144)
TRIGL SERPL-MCNC: 259 MG/DL
WBC # BLD AUTO: 5.7 10E3/UL (ref 4–11)

## 2022-07-22 LAB — TSH SERPL DL<=0.005 MIU/L-ACNC: 2.36 MU/L (ref 0.4–4)

## 2022-07-22 NOTE — RESULT ENCOUNTER NOTE
Fabian, your labs show a high cholesterol.  Also, liver enzymes are a little on the high side.  We should discuss tackling both these issues when I see you next month.  In the meantime, keep working on the weight loss.  If you consume alcohol I would suggest stopping or at least reducing.    Dayday Jarrell MD on 7/22/2022 at 4:15 PM

## 2022-10-27 ENCOUNTER — NURSE TRIAGE (OUTPATIENT)
Dept: FAMILY MEDICINE | Facility: CLINIC | Age: 62
End: 2022-10-27

## 2022-10-27 NOTE — TELEPHONE ENCOUNTER
Agree with in person eval.  UC would be an option for him.  Thanks  Dayday Jarrell MD on 10/27/2022 at 2:53 PM

## 2022-10-27 NOTE — TELEPHONE ENCOUNTER
"Patient Contact     Attempt #: 1     Was call answered?  Yes, writer reviewed provider message below. Patient states \"I can barely use one hand\", patient states they are unable to drive.   Writer advised that someone drive patient to ER or patient will need to call EMS to have them go to ER. Patient expressed verbal understanding.     Edward Santos RN  [facility]  "

## 2022-10-27 NOTE — TELEPHONE ENCOUNTER
"  Nurse Triage SBAR    Is this a 2nd Level Triage? YES, LICENSED PRACTITIONER REVIEW IS REQUIRED    Situation: Pt calling c/o Gout flare up- Pt states it started last thursday after overexertion and carrying groceries. Pt c/o swelling in joints and knuckles of pointer and middle fingers on R hand.  Pt is requesting  Methylprednisolone 4mg therapy pack for flare up as given in past by Dr Martin.   Pt denies any redness or fever.    Background: Hx of gout flare up. Taking allopurinol daily.     Assessment: See below    Protocol Recommended Disposition:   Go To Office Now    Recommendation: Go to office now for inability to use hand d/t  pain/swelling.  No appts available. Please advise.      Ok to leave detailed VM on callback     Routed to provider    Does the patient meet one of the following criteria for ADS visit consideration? 16+ years old, with an [facility] PCP     TIP  Providers, please consider if this condition is appropriate for management at one of our Acute and Diagnostic Services sites.     If patient is a good candidate, please use dotphrase <dot>triageresponse and select Refer to ADS to document.    Reason for Disposition    SEVERE pain (e.g., excruciating, unable to use hand at all)    Additional Information    Negative: Followed an injury    Negative: Wound looks infected    Negative: Caused by an animal bite    Negative: Caused by frostbite    Negative: Hand or wrist pain is main symptom    Negative: Looks infected (spreading redness, red streak, pus) and severe pain with movement    Negative: Patient sounds very sick or weak to the triager    Answer Assessment - Initial Assessment Questions  1. ONSET: \"When did the pain start?\"       About a week ago     2. LOCATION and RADIATION: \"Where is the pain located?\"  (e.g., fingertip, around nail, joint, entire   finger)       R pointer finger and middle finger    3. SEVERITY: \"How bad is the pain?\" \"What does it keep you from doing?\"   (Scale 1-10; or mild, " "moderate, severe)   - MILD (1-3): doesn't interfere with normal activities.    - MODERATE (4-7): interferes with normal activities or awakens from sleep.   - SEVERE (8-10): excruciating pain, unable to hold a glass of water or bend finger even a little.      5/10    4. APPEARANCE: \"What does the finger look like?\" (e.g., redness, swelling, bruising, pallor)      Swollen by knuckles and joints    5. WORK OR EXERCISE: \"Has there been any recent work or exercise that involved this part (i.e., fingers or hand) of the body?\"      Carrying groceries and over did it     6. CAUSE: \"What do you think is causing the pain?\"      Gout episode     7. AGGRAVATING FACTORS: \"What makes the pain worse?\" (e.g., using computer)      Unable to hold anything     8. OTHER SYMPTOMS: \"Do you have any other symptoms?\" (e.g., fever, neck pain, numbness)      No     9. PREGNANCY: \"Is there any chance you are pregnant?\" \"When was your last menstrual period?\"      No    Protocols used: FINGER PAIN-A-OH    GO TO OFFICE NOW:  * You need to be examined. Come into the office right now.   * IF NO AVAILABLE APPOINTMENTS: You need to be seen in an Urgent Care Center. Go to the one at . Leave now. A nearby Urgent Care Center is often a good source of care. Another choice is to go to the Emergency Department.        Patient/Caregiver understands and will follow care advice? Yes, plans to follow advice        REASSURANCE AND EDUCATION - OVERUSE:  * It sounds like an injury from overuse. We can treat that at home.  * Finger pain from an irritated joint can happen after forceful vigorous or repeated hand motions (e.g., washing the car, scrubbing the floor).  * Here is some care advice that should help.      CALL BACK IF:  * Swelling or severe pain occurs   * Pain lasts over 7 days  * You become worse      REST:  * You should try to avoid any exercise or activity that caused this pain for the next 3 days.      PAIN MEDICINES - EXTRA NOTES AND WARNINGS:  * " Use the lowest amount of medicine that makes your pain better.  * Acetaminophen is thought to be safer than ibuprofen or naproxen in people over 65 years old. Acetaminophen is in many OTC and prescription medicines. It might be in more than one medicine that you are taking. You need to be careful and not take an overdose. An acetaminophen overdose can hurt the liver.  * [employer], the company that makes Tylenol, has different dosage instructions for Tylenol in [place] and the [place]. In [place], the maximum recommended dose per day is 4,000 mg or twelve Regular-Strength (325 mg) pills. In the [place], the maximum dose per day is ten Regular-Strength (325 mg) pills.  * [employer], the company that makes Aleve, has different dosage instructions for Aleve in [place] and the [place]. In [place], the maximum recommended dose per day is 440 mg (2 pills or caplets). In the [place], the maximum dose per day is 660 mg (3 pills or caplets).  * CAUTION: Do not take acetaminophen if you have liver disease.  * CAUTION: Do not take ibuprofen or naproxen if you have stomach problems, kidney disease, are pregnant, or have been told by your doctor to avoid this type of anti-inflammatory drug. Do not take ibuprofen or naproxen for more than 7 days without consulting your doctor.  * Before taking any medicine, read all the instructions on the package.       Radha DELUCA Triage

## 2023-01-23 DIAGNOSIS — M1A.00X0 IDIOPATHIC CHRONIC GOUT WITHOUT TOPHUS, UNSPECIFIED SITE: ICD-10-CM

## 2023-01-24 DIAGNOSIS — N52.8 OTHER MALE ERECTILE DYSFUNCTION: ICD-10-CM

## 2023-01-24 DIAGNOSIS — I10 ESSENTIAL HYPERTENSION, BENIGN: ICD-10-CM

## 2023-01-24 RX ORDER — LISINOPRIL AND HYDROCHLOROTHIAZIDE 12.5; 2 MG/1; MG/1
2 TABLET ORAL DAILY
Qty: 180 TABLET | Refills: 1 | Status: SHIPPED | OUTPATIENT
Start: 2023-01-24 | End: 2023-06-26

## 2023-01-24 RX ORDER — SILDENAFIL 100 MG/1
TABLET, FILM COATED ORAL
Qty: 30 TABLET | Refills: 3 | Status: SHIPPED | OUTPATIENT
Start: 2023-01-24 | End: 2024-01-12

## 2023-01-24 RX ORDER — AMLODIPINE BESYLATE 2.5 MG/1
2.5 TABLET ORAL DAILY
Qty: 90 TABLET | Refills: 1 | Status: SHIPPED | OUTPATIENT
Start: 2023-01-24 | End: 2023-06-26

## 2023-01-24 RX ORDER — ALLOPURINOL 100 MG/1
TABLET ORAL
Qty: 90 TABLET | Refills: 3 | Status: SHIPPED | OUTPATIENT
Start: 2023-01-24 | End: 2023-12-26

## 2023-02-02 DIAGNOSIS — K21.9 GASTROESOPHAGEAL REFLUX DISEASE WITHOUT ESOPHAGITIS: ICD-10-CM

## 2023-02-02 RX ORDER — SUCRALFATE 1 G/1
TABLET ORAL
Qty: 360 TABLET | Refills: 1 | Status: SHIPPED | OUTPATIENT
Start: 2023-02-02

## 2023-06-24 DIAGNOSIS — I10 ESSENTIAL HYPERTENSION, BENIGN: ICD-10-CM

## 2023-06-26 RX ORDER — AMLODIPINE BESYLATE 2.5 MG/1
TABLET ORAL
Qty: 90 TABLET | Refills: 1 | Status: SHIPPED | OUTPATIENT
Start: 2023-06-26 | End: 2023-12-26

## 2023-06-26 RX ORDER — LISINOPRIL AND HYDROCHLOROTHIAZIDE 12.5; 2 MG/1; MG/1
TABLET ORAL
Qty: 180 TABLET | Refills: 1 | Status: SHIPPED | OUTPATIENT
Start: 2023-06-26 | End: 2023-12-26

## 2023-12-23 DIAGNOSIS — M1A.00X0 IDIOPATHIC CHRONIC GOUT WITHOUT TOPHUS, UNSPECIFIED SITE: ICD-10-CM

## 2023-12-24 DIAGNOSIS — I10 ESSENTIAL HYPERTENSION, BENIGN: ICD-10-CM

## 2023-12-26 RX ORDER — LISINOPRIL AND HYDROCHLOROTHIAZIDE 12.5; 2 MG/1; MG/1
TABLET ORAL
Qty: 180 TABLET | Refills: 0 | Status: SHIPPED | OUTPATIENT
Start: 2023-12-26 | End: 2024-03-11

## 2023-12-26 RX ORDER — ALLOPURINOL 100 MG/1
TABLET ORAL
Qty: 90 TABLET | Refills: 1 | Status: SHIPPED | OUTPATIENT
Start: 2023-12-26 | End: 2024-07-15

## 2023-12-26 RX ORDER — AMLODIPINE BESYLATE 2.5 MG/1
TABLET ORAL
Qty: 90 TABLET | Refills: 0 | Status: SHIPPED | OUTPATIENT
Start: 2023-12-26 | End: 2024-03-11

## 2024-01-11 DIAGNOSIS — N52.8 OTHER MALE ERECTILE DYSFUNCTION: ICD-10-CM

## 2024-01-12 RX ORDER — SILDENAFIL 100 MG/1
TABLET, FILM COATED ORAL
Qty: 30 TABLET | Refills: 2 | Status: SHIPPED | OUTPATIENT
Start: 2024-01-12

## 2024-03-12 ENCOUNTER — VIRTUAL VISIT (OUTPATIENT)
Dept: FAMILY MEDICINE | Facility: CLINIC | Age: 64
End: 2024-03-12
Payer: MEDICARE

## 2024-03-12 DIAGNOSIS — J34.89 SINUS PRESSURE: Primary | ICD-10-CM

## 2024-03-12 PROCEDURE — 99213 OFFICE O/P EST LOW 20 MIN: CPT | Mod: 95 | Performed by: PHYSICIAN ASSISTANT

## 2024-03-12 NOTE — PROGRESS NOTES
Fabian is a 63 year old who is being evaluated via a billable video visit.      How would you like to obtain your AVS? MyChart  If the video visit is dropped, the invitation should be resent by:  Will anyone else be joining your video visit?       Assessment and Plan:     (J34.89) Sinus pressure  (primary encounter diagnosis)  Comment: concerned that neighbors air conditioning unit could be impacting indoor air quality, has had intermittent mild headaches, sinus pressure/popping and shakiness x one week, symptoms resolve when he leaves his home  Plan: recommend he contact [place] Dept of health regarding possible issue with indoor air quality  Symptoms do sound c/w allergies and recommend non-sedating antihistamine, resume flonase  Way overdue for physical w/labs, will help him schedule today      [facility]         Subjective   Fabian is a 63 year old, presenting for the following health issues:  No chief complaint on file.    HPI     Fabian is concerned that his neighbor has a portable air conditioning unit is emitting toxic gas   He lives in [place] and shares one wall with this neighbor  He notes that he can hear this portable unit when his neighbor turns it on    He has noticed when his neighbor turns on the unit he has experienced mild intermittent headaches and sinus pressure/popping then he has all over shaking  His symptoms resolve with going outside    Symptoms have been ongoing x one week  He denies breathing issues, sob, fever/chills   He notes that he walks daily         Objective           Vitals:  No vitals were obtained today due to virtual visit.    Physical Exam   GENERAL: alert and no distress  EYES: Eyes grossly normal to inspection.  No discharge or erythema, or obvious scleral/conjunctival abnormalities.  RESP: No audible wheeze, cough, or visible cyanosis.    SKIN: Visible skin clear. No significant rash, abnormal pigmentation or lesions.  NEURO: Cranial  nerves grossly intact.  Mentation and speech appropriate for age.  PSYCH: Appropriate affect, tone, and pace of words        Video-Visit Details    Type of service:  Video Visit     Originating Location (pt. Location): Home    Distant Location (provider location):  On-site  Platform used for Video Visit: Doximaron  Signed Electronically by: Arlen Arango PA-C

## 2024-03-12 NOTE — PROGRESS NOTES
"Fabian is a 63 year old who is being evaluated via a billable video visit.      How would you like to obtain your AVS? Mail a copy  If the video visit is dropped, the invitation should be resent by: Text to cell phone: [phone].  Will anyone else be joining your video visit? No  {If patient encounters technical issues they should call [phone] :323076}        {PROVIDER CHARTING PREFERENCE:884108}    Subjective   Fabian is a 63 year old, presenting for the following health issues:  Headache (Patient having headache and sinus issues. Started on 3/5/2024.)  {(!) Visit Details have not yet been documented.  Please enter Visit Details and then use this list to pull in documentation. (Optional):565699}  HPI     {SUPERLIST (Optional):278207}  {additonal problems for provider to add (Optional):545206}    {ROS Picklists (Optional):355244}      Objective           Vitals:  No vitals were obtained today due to virtual visit.    Physical Exam   {video visit exam brief selected:376566}    {Diagnostic Test Results (Optional):404180}      Video-Visit Details    Type of service:  Video Visit     Originating Location (pt. Location): {video visit patient location:781698::\"Home\"}  {PROVIDER LOCATION On-site should be selected for visits conducted from your clinic location or adjoining [facility] hospital, academic office, or other nearby [facility] building. Off-site should be selected for all other provider locations, including home:544118}  Distant Location (provider location):  {virtual location provider:034187}  Platform used for Video Visit: {Virtual Visit Platforms:014135::\"[email or web address].me\"}  Signed Electronically by: Arlen Arango PA-C  {Email feedback regarding this note to [email or web address]   :285067}  "

## 2024-03-15 DIAGNOSIS — M1A.00X0 IDIOPATHIC CHRONIC GOUT WITHOUT TOPHUS, UNSPECIFIED SITE: ICD-10-CM

## 2024-03-18 RX ORDER — ALLOPURINOL 100 MG/1
TABLET ORAL
Qty: 90 TABLET | Refills: 0 | OUTPATIENT
Start: 2024-03-18

## 2024-05-27 DIAGNOSIS — I10 ESSENTIAL HYPERTENSION, BENIGN: ICD-10-CM

## 2024-05-28 RX ORDER — AMLODIPINE BESYLATE 2.5 MG/1
2.5 TABLET ORAL DAILY
Qty: 90 TABLET | Refills: 0 | Status: SHIPPED | OUTPATIENT
Start: 2024-05-28 | End: 2024-08-05

## 2024-05-28 RX ORDER — LISINOPRIL AND HYDROCHLOROTHIAZIDE 12.5; 2 MG/1; MG/1
TABLET ORAL
Qty: 180 TABLET | Refills: 0 | Status: SHIPPED | OUTPATIENT
Start: 2024-05-28 | End: 2024-08-05

## 2024-07-14 DIAGNOSIS — M1A.00X0 IDIOPATHIC CHRONIC GOUT WITHOUT TOPHUS, UNSPECIFIED SITE: ICD-10-CM

## 2024-07-15 RX ORDER — ALLOPURINOL 100 MG/1
TABLET ORAL
Qty: 90 TABLET | Refills: 1 | Status: SHIPPED | OUTPATIENT
Start: 2024-07-15

## 2024-08-05 DIAGNOSIS — I10 ESSENTIAL HYPERTENSION, BENIGN: ICD-10-CM

## 2024-08-05 RX ORDER — LISINOPRIL AND HYDROCHLOROTHIAZIDE 12.5; 2 MG/1; MG/1
TABLET ORAL
Qty: 180 TABLET | Refills: 0 | Status: SHIPPED | OUTPATIENT
Start: 2024-08-05

## 2024-08-05 RX ORDER — AMLODIPINE BESYLATE 2.5 MG/1
2.5 TABLET ORAL DAILY
Qty: 90 TABLET | Refills: 0 | Status: SHIPPED | OUTPATIENT
Start: 2024-08-05

## 2024-12-03 DIAGNOSIS — I10 ESSENTIAL HYPERTENSION, BENIGN: ICD-10-CM

## 2024-12-03 RX ORDER — AMLODIPINE BESYLATE 2.5 MG/1
2.5 TABLET ORAL DAILY
Qty: 90 TABLET | Refills: 0 | Status: SHIPPED | OUTPATIENT
Start: 2024-12-03

## 2024-12-09 DIAGNOSIS — M1A.00X0 IDIOPATHIC CHRONIC GOUT WITHOUT TOPHUS, UNSPECIFIED SITE: ICD-10-CM

## 2024-12-09 RX ORDER — ALLOPURINOL 100 MG/1
TABLET ORAL
Qty: 90 TABLET | Refills: 0 | OUTPATIENT
Start: 2024-12-09

## 2024-12-10 ENCOUNTER — VIRTUAL VISIT (OUTPATIENT)
Dept: FAMILY MEDICINE | Facility: CLINIC | Age: 64
End: 2024-12-10
Payer: MEDICARE

## 2024-12-10 DIAGNOSIS — N52.8 OTHER MALE ERECTILE DYSFUNCTION: ICD-10-CM

## 2024-12-10 DIAGNOSIS — M1A.00X0 IDIOPATHIC CHRONIC GOUT WITHOUT TOPHUS, UNSPECIFIED SITE: Primary | ICD-10-CM

## 2024-12-10 DIAGNOSIS — I10 ESSENTIAL HYPERTENSION, BENIGN: ICD-10-CM

## 2024-12-10 DIAGNOSIS — K21.9 GASTROESOPHAGEAL REFLUX DISEASE WITHOUT ESOPHAGITIS: ICD-10-CM

## 2024-12-10 PROCEDURE — 99441 PR PHYSICIAN TELEPHONE EVALUATION 5-10 MIN: CPT | Mod: 93 | Performed by: INTERNAL MEDICINE

## 2024-12-10 RX ORDER — AMLODIPINE BESYLATE 2.5 MG/1
2.5 TABLET ORAL DAILY
Qty: 90 TABLET | Refills: 0 | Status: SHIPPED | OUTPATIENT
Start: 2024-12-10

## 2024-12-10 RX ORDER — LISINOPRIL AND HYDROCHLOROTHIAZIDE 12.5; 2 MG/1; MG/1
2 TABLET ORAL DAILY
Qty: 180 TABLET | Refills: 0 | Status: SHIPPED | OUTPATIENT
Start: 2024-12-10

## 2024-12-10 RX ORDER — SILDENAFIL 100 MG/1
100 TABLET, FILM COATED ORAL DAILY PRN
Qty: 30 TABLET | Refills: 2 | Status: SHIPPED | OUTPATIENT
Start: 2024-12-10

## 2024-12-10 RX ORDER — SUCRALFATE 1 G/1
TABLET ORAL
Qty: 360 TABLET | Refills: 0 | Status: SHIPPED | OUTPATIENT
Start: 2024-12-10

## 2024-12-10 NOTE — PROGRESS NOTES
Fabian is a 64 year old who is being evaluated via a billable telephone visit.    What phone number would you like to be contacted at? [phone]   How would you like to obtain your AVS? Mail a copy  Originating Location (pt. Location): Home    Distant Location (provider location):  On-site    Assessment & Plan     Idiopathic chronic gout without tophus, unspecified site  1 gout flare in the last 2 months and that was when he ran out of allopurinol.  We will check labs as ordered.  Medication refill sent.    Essential hypertension, benign  Not checking blood pressure at home.  Unclear whether his current blood pressure regimen is appropriate for him or not.  He has lost about 30 pounds.  Labs ordered.  Medication is refilled.  Will have the patient return to clinic for preventive care visit and check blood pressure.    Other male erectile dysfunction  Med refilled.  As needed use.    Gastroesophageal reflux disease without esophagitis  Uses Carafate about once a day.  I think this is probably something we can hopefully get off of.    Will have him return to clinic for preventive care plus problem focused visit in the first quarter of next year.      The longitudinal plan of care for the diagnosis(es)/condition(s) as documented were addressed during this visit. Due to the added complexity in care, I will continue to support Fabian in the subsequent management and with ongoing continuity of care.        Subjective   Fabian is a 64 year old, presenting for the following health issues:  Medication Refill            12/10/2024     9:42 AM   Additional Questions   Roomed by Gwen ZAYAS MA     History of Present Illness       Reason for visit:  Med refill   He is taking medications regularly.       I last saw Fabian in July 2022.    HTN  Medications noted.  Does not check BP at home but did recently order a new BP machine.   He has lost about 30lbs since our last visit.     Gout:  Had one attack about 8 months ago.  Ran out of  allopurinol at that time.      GI  Deals with GERD.  States overall much better than a couple years ago.  He does take Carafate though not the 4 tabs a day.  Usually once a day.    We note he had non response with H2 blocker in the past.              Objective           Vitals:  No vitals were obtained today due to virtual visit.  Wt Readings from Last 3 Encounters:   07/19/22 136.1 kg (300 lb)   03/20/20 123.8 kg (273 lb)   02/21/20 119.3 kg (263 lb)         Physical Exam   General: Alert and no distress //Respiratory: No audible wheeze, cough, or shortness of breath // Psychiatric:  Appropriate affect, tone, and pace of words            Phone call duration: 10 minutes  Signed Electronically by: Dayday Jarrell MD

## 2025-01-07 DIAGNOSIS — M1A.00X0 IDIOPATHIC CHRONIC GOUT WITHOUT TOPHUS, UNSPECIFIED SITE: ICD-10-CM

## 2025-01-07 RX ORDER — ALLOPURINOL 100 MG/1
TABLET ORAL
Qty: 90 TABLET | Refills: 0 | Status: SHIPPED | OUTPATIENT
Start: 2025-01-07

## 2025-02-27 DIAGNOSIS — N52.8 OTHER MALE ERECTILE DYSFUNCTION: ICD-10-CM

## 2025-02-27 RX ORDER — SILDENAFIL 100 MG/1
TABLET, FILM COATED ORAL
Qty: 30 TABLET | Refills: 1 | Status: SHIPPED | OUTPATIENT
Start: 2025-02-27

## 2025-03-02 DIAGNOSIS — I10 ESSENTIAL HYPERTENSION, BENIGN: ICD-10-CM

## 2025-03-03 RX ORDER — LISINOPRIL AND HYDROCHLOROTHIAZIDE 12.5; 2 MG/1; MG/1
2 TABLET ORAL DAILY
Qty: 180 TABLET | Refills: 1 | Status: SHIPPED | OUTPATIENT
Start: 2025-03-03

## 2025-03-03 RX ORDER — AMLODIPINE BESYLATE 2.5 MG/1
2.5 TABLET ORAL DAILY
Qty: 90 TABLET | Refills: 1 | Status: SHIPPED | OUTPATIENT
Start: 2025-03-03

## 2025-03-16 DIAGNOSIS — M1A.00X0 IDIOPATHIC CHRONIC GOUT WITHOUT TOPHUS, UNSPECIFIED SITE: ICD-10-CM

## 2025-03-17 RX ORDER — ALLOPURINOL 100 MG/1
TABLET ORAL
Qty: 90 TABLET | Refills: 0 | Status: SHIPPED | OUTPATIENT
Start: 2025-03-17

## 2025-04-29 DIAGNOSIS — N52.8 OTHER MALE ERECTILE DYSFUNCTION: ICD-10-CM

## 2025-04-29 RX ORDER — SILDENAFIL 100 MG/1
TABLET, FILM COATED ORAL
Qty: 30 TABLET | Refills: 0 | Status: SHIPPED | OUTPATIENT
Start: 2025-04-29

## 2025-06-14 DIAGNOSIS — M1A.00X0 IDIOPATHIC CHRONIC GOUT WITHOUT TOPHUS, UNSPECIFIED SITE: ICD-10-CM

## 2025-06-16 RX ORDER — ALLOPURINOL 100 MG/1
100 TABLET ORAL DAILY
Qty: 90 TABLET | Refills: 1 | Status: SHIPPED | OUTPATIENT
Start: 2025-06-16